# Patient Record
Sex: MALE | Race: BLACK OR AFRICAN AMERICAN | RURAL
[De-identification: names, ages, dates, MRNs, and addresses within clinical notes are randomized per-mention and may not be internally consistent; named-entity substitution may affect disease eponyms.]

---

## 2017-03-06 DIAGNOSIS — I10 ESSENTIAL HYPERTENSION: ICD-10-CM

## 2017-03-06 NOTE — TELEPHONE ENCOUNTER
Patient last seen by Dr. Harrington Lesch in 2014. The other providers he seen in the office recently are no longer here.

## 2017-03-09 RX ORDER — LISINOPRIL 10 MG/1
10 TABLET ORAL DAILY
Qty: 30 TAB | Refills: 2 | OUTPATIENT
Start: 2017-03-09

## 2017-03-09 NOTE — TELEPHONE ENCOUNTER
Attempted to call. No answer. Message left. Patient will need an appointment. Has not been seen here in over a year.

## 2017-08-09 ENCOUNTER — OFFICE VISIT (OUTPATIENT)
Dept: FAMILY MEDICINE CLINIC | Age: 52
End: 2017-08-09

## 2017-08-09 VITALS
OXYGEN SATURATION: 99 % | HEIGHT: 70 IN | DIASTOLIC BLOOD PRESSURE: 111 MMHG | BODY MASS INDEX: 26.4 KG/M2 | TEMPERATURE: 98.2 F | WEIGHT: 184.4 LBS | SYSTOLIC BLOOD PRESSURE: 181 MMHG | RESPIRATION RATE: 20 BRPM | HEART RATE: 88 BPM

## 2017-08-09 DIAGNOSIS — Z91.14 NON COMPLIANCE W MEDICATION REGIMEN: ICD-10-CM

## 2017-08-09 DIAGNOSIS — F10.10 ALCOHOL ABUSE, DAILY USE: ICD-10-CM

## 2017-08-09 DIAGNOSIS — I10 ESSENTIAL HYPERTENSION: Primary | ICD-10-CM

## 2017-08-09 DIAGNOSIS — R80.9 PROTEINURIA, UNSPECIFIED TYPE: ICD-10-CM

## 2017-08-09 LAB
BILIRUB UR QL STRIP: NEGATIVE
CREAT BLD-MCNC: 200 MG/DL (ref 0.6–1.3)
GLUCOSE UR-MCNC: NEGATIVE MG/DL
KETONES P FAST UR STRIP-MCNC: NEGATIVE MG/DL
MICROALB/CRT. RATIO, 140096: ABNORMAL MG/G
MICROALBUMIN UR TEST STR-MCNC: 80 MG/L
PH UR STRIP: 5.5 [PH] (ref 4.6–8)
PROT UR QL STRIP: NORMAL MG/DL
SP GR UR STRIP: 1.02 (ref 1–1.03)
UA UROBILINOGEN AMB POC: NORMAL (ref 0.2–1)
URINALYSIS CLARITY POC: CLEAR
URINALYSIS COLOR POC: YELLOW
URINE BLOOD POC: NEGATIVE
URINE LEUKOCYTES POC: NEGATIVE
URINE NITRITES POC: NEGATIVE

## 2017-08-09 RX ORDER — LISINOPRIL 10 MG/1
10 TABLET ORAL DAILY
Qty: 30 TAB | Refills: 5 | Status: SHIPPED | OUTPATIENT
Start: 2017-08-09 | End: 2019-09-23 | Stop reason: SDUPTHER

## 2017-08-09 RX ORDER — CLONIDINE HYDROCHLORIDE 0.1 MG/1
0.1 TABLET ORAL
Qty: 1 TAB | Refills: 0
Start: 2017-08-09 | End: 2017-08-09

## 2017-08-09 NOTE — PATIENT INSTRUCTIONS
Please monitor your blood pressure. Goal is less than 140/90. Acute High Blood Pressure: Care Instructions  Your Care Instructions  Acute high blood pressure is very high blood pressure. It's a serious problem. Very high blood pressure can damage your brain, heart, eyes, and kidneys. You may have been given medicines to lower your blood pressure. You may have gotten them as pills or through a needle in one of your veins. This is called an IV. And maybe you were given other medicines too. These can be needed when high blood pressure causes other problems. To keep your blood pressure at a lower level, you may need to make healthy lifestyle changes. And you will probably need to take medicines. Be sure to follow up with your doctor about your blood pressure and what you can do about it. Follow-up care is a key part of your treatment and safety. Be sure to make and go to all appointments, and call your doctor if you are having problems. It's also a good idea to know your test results and keep a list of the medicines you take. How can you care for yourself at home? · See your doctor as often as he or she recommends. This is to make sure your blood pressure is under control. You will probably go at least 2 times a year. But it may be more often at first.  · Take your blood pressure medicine exactly as prescribed. You may take one or more types. They include diuretics, beta-blockers, ACE inhibitors, calcium channel blockers, and angiotensin II receptor blockers. Call your doctor if you think you are having a problem with your medicine. · If you take blood pressure medicine, talk to your doctor before you take decongestants or anti-inflammatory medicine, such as ibuprofen. These can raise blood pressure. · Learn how to check your blood pressure at home. Check it often. · Ask your doctor if it's okay to drink alcohol. · Talk to your doctor about lifestyle changes that can help blood pressure.  These include being active and not smoking. When should you call for help? Call 911 anytime you think you may need emergency care. This may mean having symptoms that suggest that your blood pressure is causing a serious heart or blood vessel problem. Your blood pressure may be over 180/110. For example, call 911 if:  · You have symptoms of a heart attack. These may include:  ¨ Chest pain or pressure, or a strange feeling in the chest.  ¨ Sweating. ¨ Shortness of breath. ¨ Nausea or vomiting. ¨ Pain, pressure, or a strange feeling in the back, neck, jaw, or upper belly or in one or both shoulders or arms. ¨ Lightheadedness or sudden weakness. ¨ A fast or irregular heartbeat. · You have symptoms of a stroke. These may include:  ¨ Sudden numbness, tingling, weakness, or loss of movement in your face, arm, or leg, especially on only one side of your body. ¨ Sudden vision changes. ¨ Sudden trouble speaking. ¨ Sudden confusion or trouble understanding simple statements. ¨ Sudden problems with walking or balance. ¨ A sudden, severe headache that is different from past headaches. · You have severe back or belly pain. Do not wait until your blood pressure comes down on its own. Get help right away. Call your doctor now or seek immediate care if:  · Your blood pressure is much higher than normal (such as 180/110 or higher), but you don't have symptoms. · You think high blood pressure is causing symptoms, such as:  ¨ Severe headache. ¨ Blurry vision. Watch closely for changes in your health, and be sure to contact your doctor if:  · Your blood pressure measures 140/90 or higher at least 2 times. That means the top number is 140 or higher or the bottom number is 90 or higher, or both. · You think you may be having side effects from your blood pressure medicine. · Your blood pressure is usually normal, but it goes above normal at least 2 times. Where can you learn more?   Go to http://bhaskar-shavonne.info/. Enter G478 in the search box to learn more about \"Acute High Blood Pressure: Care Instructions. \"  Current as of: August 8, 2016  Content Version: 11.3  © 4931-1983 [employer]. Care instructions adapted under license by [employer] (which disclaims liability or warranty for this information). If you have questions about a medical condition or this instruction, always ask your healthcare professional. [address] any warranty or liability for your use of this information.

## 2017-08-09 NOTE — PROGRESS NOTES
Health Maintenance Due   Topic Date Due    DTaP/Tdap/Td series (1 - Tdap) 10/23/1987    FOBT Q 1 YEAR AGE 50-75  10/23/2016    INFLUENZA AGE 9 TO ADULT  08/01/2017

## 2017-08-09 NOTE — MR AVS SNAPSHOT
Visit Information Date & Time Provider Department Dept. Phone Encounter #  
 8/9/2017  3:40 PM Malgorzata Walker MD [address] [place] [address] [phone] [id number] Follow-up Instructions Return in about 6 months (around 2/9/2018). Upcoming Health Maintenance Date Due DTaP/Tdap/Td series (1 - Tdap) 10/23/1987 FOBT Q 1 YEAR AGE 50-75 10/23/2016 INFLUENZA AGE 9 TO ADULT 8/1/2017 Allergies as of 8/9/2017  Review Complete On: 8/9/2017 By: Chemo Bingham Severity Noted Reaction Type Reactions Penicillin G High 06/07/2012   Systemic Swelling Tongue swelling Current Immunizations  Never Reviewed No immunizations on file. Not reviewed this visit You Were Diagnosed With   
  
 Codes Comments Essential hypertension    -  Primary ICD-10-CM: I10 
ICD-9-CM: 401.9 Proteinuria, unspecified type     ICD-10-CM: R80.9 ICD-9-CM: 791.0 Vitals BP Pulse Temp Resp Height(growth percentile) Weight(growth percentile) (!) 181/111 88 98.2 °F (36.8 °C) (Oral) 20 5' 10\" (1.778 m) 184 lb 6.4 oz (83.6 kg) SpO2 BMI Smoking Status 99% 26.46 kg/m2 Never Smoker Vitals History BMI and BSA Data Body Mass Index Body Surface Area  
 26.46 kg/m 2 2.03 m 2 Preferred Pharmacy Pharmacy Name Phone [address] [place], [place] - [address] [phone] Your Updated Medication List  
  
   
This list is accurate as of: 8/9/17  4:57 PM.  Always use your most recent med list.  
  
  
  
  
 cloNIDine HCl 0.1 mg tablet Commonly known as:  CATAPRES Take 1 Tab by mouth now for 1 dose. lisinopril 10 mg tablet Commonly known as:  [place] Take 1 Tab by mouth daily. Prescriptions Sent to Pharmacy Refills  
 lisinopril (PRINIVIL, ZESTRIL) 10 mg tablet 5 Sig: Take 1 Tab by mouth daily.   
 Class: Normal  
 Pharmacy: [facility] [place], [address] [facility] #: [phone] Route: Oral  
  
We Performed the Following AMB POC EKG ROUTINE W/ 12 LEADS, INTER & REP [34367 CPT(R)] AMB POC URINALYSIS DIP STICK AUTO W/O MICRO [06449 CPT(R)] AMB POC URINE, MICROALBUMIN, SEMIQUANT (1 RESULT) [31178 CPT(R)] Follow-up Instructions Return in about 6 months (around 2/9/2018). Patient Instructions Please monitor your blood pressure. Goal is less than 140/90. Acute High Blood Pressure: Care Instructions Your Care Instructions Acute high blood pressure is very high blood pressure. It's a serious problem. Very high blood pressure can damage your brain, heart, eyes, and kidneys. You may have been given medicines to lower your blood pressure. You may have gotten them as pills or through a needle in one of your veins. This is called an IV. And maybe you were given other medicines too. These can be needed when high blood pressure causes other problems. To keep your blood pressure at a lower level, you may need to make healthy lifestyle changes. And you will probably need to take medicines. Be sure to follow up with your doctor about your blood pressure and what you can do about it. Follow-up care is a key part of your treatment and safety. Be sure to make and go to all appointments, and call your doctor if you are having problems. It's also a good idea to know your test results and keep a list of the medicines you take. How can you care for yourself at home? · See your doctor as often as he or she recommends. This is to make sure your blood pressure is under control. You will probably go at least 2 times a year. But it may be more often at first. 
· Take your blood pressure medicine exactly as prescribed. You may take one or more types. They include diuretics, beta-blockers, ACE inhibitors, calcium channel blockers, and angiotensin II receptor blockers.  Call your doctor if you think you are having a problem with your medicine. · If you take blood pressure medicine, talk to your doctor before you take decongestants or anti-inflammatory medicine, such as ibuprofen. These can raise blood pressure. · Learn how to check your blood pressure at home. Check it often. · Ask your doctor if it's okay to drink alcohol. · Talk to your doctor about lifestyle changes that can help blood pressure. These include being active and not smoking. When should you call for help? Call 911 anytime you think you may need emergency care. This may mean having symptoms that suggest that your blood pressure is causing a serious heart or blood vessel problem. Your blood pressure may be over 180/110. For example, call 911 if: 
· You have symptoms of a heart attack. These may include: ¨ Chest pain or pressure, or a strange feeling in the chest. 
¨ Sweating. ¨ Shortness of breath. ¨ Nausea or vomiting. ¨ Pain, pressure, or a strange feeling in the back, neck, jaw, or upper belly or in one or both shoulders or arms. ¨ Lightheadedness or sudden weakness. ¨ A fast or irregular heartbeat. · You have symptoms of a stroke. These may include: 
¨ Sudden numbness, tingling, weakness, or loss of movement in your face, arm, or leg, especially on only one side of your body. ¨ Sudden vision changes. ¨ Sudden trouble speaking. ¨ Sudden confusion or trouble understanding simple statements. ¨ Sudden problems with walking or balance. ¨ A sudden, severe headache that is different from past headaches. · You have severe back or belly pain. Do not wait until your blood pressure comes down on its own. Get help right away. Call your doctor now or seek immediate care if: 
· Your blood pressure is much higher than normal (such as 180/110 or higher), but you don't have symptoms. · You think high blood pressure is causing symptoms, such as: ¨ Severe headache. ¨ Blurry vision. Watch closely for changes in your health, and be sure to contact your doctor if: 
· Your blood pressure measures 140/90 or higher at least 2 times. That means the top number is 140 or higher or the bottom number is 90 or higher, or both. · You think you may be having side effects from your blood pressure medicine. · Your blood pressure is usually normal, but it goes above normal at least 2 times. Where can you learn more? Go to http://bhaskar-shavonne.info/. Enter E864 in the search box to learn more about \"Acute High Blood Pressure: Care Instructions. \" Current as of: August 8, 2016 Content Version: 11.3 © 7013-4647 [employer]. Care instructions adapted under license by [employer] (which disclaims liability or warranty for this information). If you have questions about a medical condition or this instruction, always ask your healthcare professional. [place] [address] any warranty or liability for your use of this information. Introducing [address]! [facility] introduces [employer] patient portal. Now you can access parts of your medical record, email your doctor's office, and request medication refills online. 1. In your internet browser, go to [email or web address] 2. Click on the First Time User? Click Here link in the Sign In box. You will see the New Member Sign Up page. 3. Enter your [employer] Access Code exactly as it appears below. You will not need to use this code after youve completed the sign-up process. If you do not sign up before the expiration date, you must request a new code. · [employer] Access Code: [email or web address] Expires: 11/7/2017  3:45 PM 
 
4. Enter the last four digits of your Social Security Number (xxxx) and Date of Birth (mm/dd/yyyy) as indicated and click Submit. You will be taken to the next sign-up page. 5. Create a ChangePandat ID.  This will be your [employer] login ID and cannot be changed, so think of one that is secure and easy to remember. 6. Create a [employer] password. You can change your password at any time. 7. Enter your Password Reset Question and Answer. This can be used at a later time if you forget your password. 8. Enter your e-mail address. You will receive e-mail notification when new information is available in [address] 9. Click Sign Up. You can now view and download portions of your medical record. 10. Click the Download Summary menu link to download a portable copy of your medical information. If you have questions, please visit the Frequently Asked Questions section of the [employer] website. Remember, [employer] is NOT to be used for urgent needs. For medical emergencies, dial 911. Now available from your iPhone and Android! Please provide this summary of care documentation to your next provider. Your primary care clinician is listed as Gayle Harding. If you have any questions after today's visit, please call [phone].

## 2017-08-12 NOTE — PROGRESS NOTES
Patient: Polly Huerta MRN: [id number]  SSN: [id number]    YOB: 1965  Age: 49 y.o. Sex: male        Subjective:     Chief Complaint   Patient presents with    Hypertension    Medication Refill       HPI: he is a 49y.o. year old male who presents for medication refills. Patient with hx of HTN and has not been on medication for months. Patient denies HA, dizziness, SOB, CP, abdominal pain, dysuria, myalgias or arthralgias. He works as a cook. He admits to drinking 8-9 beers daily. Has hx of palpitations. Risks of uncontrolled HTN discussed at length. Importance of regular follow up and monitoring of BP and lab results discussed as well. Also advised patient to apply for Care Card. Patient expressed understanding. Encounter Diagnoses   Name Primary?  Essential hypertension Yes    Proteinuria, unspecified type     Alcohol abuse, daily use     Non compliance w medication regimen        BP Readings from Last 3 Encounters:   08/09/17 (!) 181/111   01/06/16 (!) 151/97   09/16/15 139/82       Wt Readings from Last 3 Encounters:   08/09/17 184 lb 6.4 oz (83.6 kg)   01/06/16 184 lb (83.5 kg)   09/16/15 184 lb 9.6 oz (83.7 kg)     Body mass index is 26.46 kg/(m^2). Patient advised to log blood pressures at home 2-3 times weekly and bring to next visit. Call office as soon as possible if BP's over 140/90 on multiple occasions or with symptoms of dizziness, chest pain, shortness of breath, headache or ankle swelling. Our goal is to normalize the blood pressure to decrease the risks of strokes and heart attacks. The patient is in agreement with the plan. Current and past medical information:    Current Medications after this visit[de-identified]     Current Outpatient Prescriptions   Medication Sig    lisinopril (PRINIVIL, ZESTRIL) 10 mg tablet Take 1 Tab by mouth daily. No current facility-administered medications for this visit.         Patient Active Problem List    Diagnosis Date Noted    Essential hypertension 09/16/2015    Bilateral low back pain without sciatica 09/16/2015    Anxiety 06/07/2012    Heart palpitations 06/07/2012       History reviewed. No pertinent past medical history. Allergies   Allergen Reactions    Penicillin G Swelling     Tongue swelling       History reviewed. No pertinent surgical history. Social History     Social History    Marital status: SINGLE     Spouse name: N/A    Number of children: N/A    Years of education: N/A     Social History Main Topics    Smoking status: Never Smoker    Smokeless tobacco: Never Used    Alcohol use No    Drug use: No    Sexual activity: Not Asked     Other Topics Concern    None     Social History Narrative         Objective:     Review of Systems:  Constitutional: Negative for fatigue or malaise  Derm: Negative for rash or lesion  HEENT: Negative for acute hearing or vision changes  Cardiovascular: Negative for dizziness, chest pain or palpitations  Respiratory: Negative for cough, wheezing or SOB  Gastreintestinal: Negative for nausea or abdominal pain  Genital/urinary: Negative for dysuria or voiding dysfunction  Muscoloskeletal: Negative for acute myalgias or arthralgias   Neurological: Negative for headache, weakness or paresthesia  Psychological: Negative for depression or anxiety      Vitals:    08/09/17 1558   BP: (!) 181/111   Pulse: 88   Resp: 20   Temp: 98.2 °F (36.8 °C)   TempSrc: Oral   SpO2: 99%   Weight: 184 lb 6.4 oz (83.6 kg)   Height: 5' 10\" (1.778 m)      Body mass index is 26.46 kg/(m^2).     Physical Exam:  Constitutional: well developed, well nourished, in no acute distress  Skin: warm and dry  Head: normocephalic, atraumatic  Eyes: sclera clear, EOMI  Neck: normal range of motion  Cardiovascular: normal S1, S2, regular rate and rhythm  Respiratory: clear to auscultation bilaterally with symmetrical effort  Extremities: full range of motion, no edema  Neurology: normal strength and sensation  Psych: active, alert and oriented, affect appropriate       Health Maintenance Due   Topic Date Due    DTaP/Tdap/Td series (1 - Tdap) 10/23/1987    FOBT Q 1 YEAR AGE 50-75  10/23/2016    INFLUENZA AGE 9 TO ADULT  08/01/2017       Risk, benefits and potential costs of recommended health maintenance discussed. Patient expressed understanding and declined at this time. Assessment and orders:       ICD-10-CM ICD-9-CM    1. Essential hypertension I10 401.9 lisinopril (PRINIVIL, ZESTRIL) 10 mg tablet      AMB POC EKG ROUTINE W/ 12 LEADS, INTER & REP      AMB POC URINALYSIS DIP STICK AUTO W/O MICRO      AMB POC URINE, MICROALBUMIN, SEMIQUANT (1 RESULT)   2. Proteinuria, unspecified type R80.9 791.0    3. Alcohol abuse, daily use F10.10 305.01    4. Non compliance w medication regimen Z91.14 V15.81          Plan of care:  Diagnoses were discussed in detail with patient. Medication risks/benefits/side effects discussed with patient. Risks of non-compliance with medication and uncontrolled BP discussed at length with patient. These include, but are not limited to heart attack, stroke and sudden death. Patient expressed understanding. Strongly advised patient to stop all use of alcohol. All of the patient's questions were addressed and answered to apparent satisfaction. The patient understands and agrees with our plan of care. The patient knows to call back if they have questions about the plan of care or if symptoms change. The patient received an After-Visit Summary which contains VS, diagnoses, orders, allergy and medication lists. No care team member to display    Follow-up Disposition:  Return in about 6 months (around 2/9/2018). No future appointments.     Signed By: Aki Tomlinson MD     August 12, 2017

## 2018-10-22 ENCOUNTER — OFFICE VISIT (OUTPATIENT)
Dept: FAMILY MEDICINE CLINIC | Age: 53
End: 2018-10-22

## 2018-10-22 VITALS
TEMPERATURE: 97.7 F | RESPIRATION RATE: 20 BRPM | BODY MASS INDEX: 24.97 KG/M2 | SYSTOLIC BLOOD PRESSURE: 148 MMHG | HEART RATE: 69 BPM | HEIGHT: 70 IN | DIASTOLIC BLOOD PRESSURE: 87 MMHG | WEIGHT: 174.4 LBS | OXYGEN SATURATION: 100 %

## 2018-10-22 DIAGNOSIS — M79.89 FINGER SWELLING: ICD-10-CM

## 2018-10-22 DIAGNOSIS — H72.91 EARDRUM RUPTURE, RIGHT: Primary | ICD-10-CM
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
## 2018-10-22 NOTE — LETTER
NOTIFICATION RETURN TO WORK  
 
10/22/2018 1:32 PM 
 
Mr. Surinder Pollack [address] 
[address] To Whom It May Concern: 
 
Surinder Pollack is currently under the care of Israel Álvarez. He will return to work on 10/23/2018. If there are questions or concerns please have the patient contact our office. Sincerely, Shruthi Ford MD

## 2018-10-22 NOTE — PATIENT INSTRUCTIONS
Keeping Ears Dry: Care Instructions Your Care Instructions Your doctor wants you to keep water from getting into your ears. You may need to do this because of a ruptured eardrum, an ear infection, or other ear problems. Follow-up care is a key part of your treatment and safety. Be sure to make and go to all appointments, and call your doctor if you are having problems. It's also a good idea to know your test results and keep a list of the medicines you take. How can you care for yourself at home? · Take baths until your doctor says you can take showers again. Avoid getting water in the ear until after the problem clears up. Ask your doctor if you should use earplugs to keep water out of your ears. · Do not swim until your doctor says you can. · If you get water in your ears, turn your head to each side and pull the earlobe in different directions. This will help the water run out. If your ears are still wet, use a hair dryer set on the lowest heat. Hold the dryer several inches from your ear. · Use your medicines exactly as prescribed. Call your doctor if you think you are having a problem with your medicine. Do not put drops in your ears unless your doctor prescribes them. When should you call for help? Watch closely for changes in your health, and be sure to contact your doctor if you have any problems. Where can you learn more? Go to http://bhaskar-shavonne.info/. Enter N904 in the search box to learn more about \"Keeping Ears Dry: Care Instructions. \" Current as of: March 28, 2018 Content Version: 11.8 © 4770-3234 [employer]. Care instructions adapted under license by [employer] (which disclaims liability or warranty for this information). If you have questions about a medical condition or this instruction, always ask your healthcare professional. [address] any warranty or liability for your use of this information.

## 2018-10-22 NOTE — PROGRESS NOTES
1. Have you been to the ER, urgent care clinic since your last visit? Hospitalized since your last visit? No 
 
2. Have you seen or consulted any other health care providers outside of the [address] [place] [address] since your last visit? Include any pap smears or colon screening. No 
Reviewed record in preparation for visit and have necessary documentation Goals that were addressed and/or need to be completed during or after this appointment include Health Maintenance Due Topic Date Due  
 DTaP/Tdap/Td series (1 - Tdap) 10/23/1987  Shingrix Vaccine Age 50> (1 of 2) 10/23/2016  FOBT Q 1 YEAR AGE 50-75  10/23/2016  Influenza Age 5 to Adult  08/01/2018

## 2018-10-26 NOTE — PROGRESS NOTES
Patient: Silvio Austin MRN: [id number]  SSN: [id number] YOB: 1965  Age: 47 y.o. Sex: male Subjective: Chief Complaint Patient presents with  
 Hand Swelling  Ear Pain HPI: he is a 47y.o. year old male who presents for right ear pain. Patient says he fell against wall when changing light bulb. Says he has been unable to hear out of right ear since that time. Patient denies HA, dizziness, SOB, CP, abdominal pain, dysuria, myalgias or arthralgias. He works as a cook. And complains of right distal finger swelling. Denies acute injury. Encounter Diagnoses Name Primary?  Eardrum rupture, right Yes  Finger swelling  Fall in home, initial encounter BP Readings from Last 3 Encounters:  
10/22/18 148/87  
08/09/17 (!) 181/111  
01/06/16 (!) 151/97 Wt Readings from Last 3 Encounters:  
10/22/18 174 lb 6.4 oz (79.1 kg) 08/09/17 184 lb 6.4 oz (83.6 kg) 01/06/16 184 lb (83.5 kg) Body mass index is 25.02 kg/m². Patient advised to log blood pressures at home 2-3 times weekly and bring to next visit. Call office as soon as possible if BP's over 140/90 on multiple occasions or with symptoms of dizziness, chest pain, shortness of breath, headache or ankle swelling. Our goal is to normalize the blood pressure to decrease the risks of strokes and heart attacks. The patient is in agreement with the plan. Current and past medical information: 
 
Current Medications after this visit[de-identified]    
Current Outpatient Medications Medication Sig  
 lisinopril (PRINIVIL, ZESTRIL) 10 mg tablet Take 1 Tab by mouth daily. No current facility-administered medications for this visit. Patient Active Problem List  
 Diagnosis Date Noted  Essential hypertension 09/16/2015  Bilateral low back pain without sciatica 09/16/2015  Anxiety 06/07/2012  Heart palpitations 06/07/2012 History reviewed. No pertinent past medical history. Allergies Allergen Reactions  Penicillin G Swelling Tongue swelling History reviewed. No pertinent surgical history. Social History Socioeconomic History  Marital status: SINGLE Spouse name: Not on file  Number of children: Not on file  Years of education: Not on file  Highest education level: Not on file Social Needs  Financial resource strain: Not on file  Food insecurity - worry: Not on file  Food insecurity - inability: Not on file  Transportation needs - medical: Not on file  Transportation needs - non-medical: Not on file Occupational History  Not on file Tobacco Use  Smoking status: Never Smoker  Smokeless tobacco: Never Used Substance and Sexual Activity  Alcohol use: No  
 Drug use: No  
 Sexual activity: Not on file Other Topics Concern  Not on file Social History Narrative  Not on file Objective:  
 
Review of Systems: 
Constitutional: Negative for fatigue or malaise Derm: see HPI HEENT: see HPI Cardiovascular: Negative for dizziness, chest pain or palpitations Respiratory: Negative for cough, wheezing or SOB Gastreintestinal: Negative for nausea or abdominal pain Genital/urinary: Negative for dysuria or voiding dysfunction Muscoloskeletal: Negative for acute myalgias or arthralgias Neurological: Negative for headache, weakness or paresthesia Psychological: Negative for depression or anxiety Vitals:  
 10/22/18 1309 BP: 148/87 Pulse: 69 Resp: 20 Temp: 97.7 °F (36.5 °C) TempSrc: Oral  
SpO2: 100% Weight: 174 lb 6.4 oz (79.1 kg) Height: 5' 10\" (1.778 m) Body mass index is 25.02 kg/m². Physical Exam: 
Constitutional: well developed, well nourished, in no acute distress Skin: right distal palmar erythema and edema Head: normocephalic, atraumatic Eyes: sclera clear, EOMI Ears: perforated right tympanic membrane Neck: normal range of motion Cardiovascular: normal S1, S2, regular rate and rhythm Respiratory: clear to auscultation bilaterally with symmetrical effort Extremities: full range of motion, no edema Neurology: normal strength and sensation Psych: active, alert and oriented, affect appropriate Health Maintenance Due Topic Date Due  
 DTaP/Tdap/Td series (1 - Tdap) 10/23/1987  Shingrix Vaccine Age 50> (1 of 2) 10/23/2016  FOBT Q 1 YEAR AGE 50-75  10/23/2016 Risk, benefits and potential costs of recommended health maintenance discussed. Patient expressed understanding and declined at this time. Assessment and orders: ICD-10-CM ICD-9-CM 1. Eardrum rupture, right H72.91 384.20   
2. Finger swelling M79.89 729.81   
3. Fall in home, initial encounter W19. [place] Fines Y980.3 Y92.009 E849.0 Plan of care: 
Diagnoses were discussed in detail with patient. Patient sans health insurance. Will need follow up to monitor healing of right ear. Advised to keep fear dry. Medication risks/benefits/side effects discussed with patient. Patient expressed understanding. All of the patient's questions were addressed and answered to apparent satisfaction. The patient understands and agrees with our plan of care. The patient knows to call back if they have questions about the plan of care or if symptoms change. The patient received an After-Visit Summary which contains VS, diagnoses, orders, allergy and medication lists. No care team member to display Follow-up Disposition: 
Return in about 4 weeks (around 11/19/2018), or if symptoms worsen or fail to improve. No future appointments. Signed By: Jessica Guy MD   
 October 26, 2018

## 2019-09-23 ENCOUNTER — OFFICE VISIT (OUTPATIENT)
Dept: FAMILY MEDICINE CLINIC | Age: 54
End: 2019-09-23

## 2019-09-23 VITALS
HEART RATE: 73 BPM | RESPIRATION RATE: 16 BRPM | OXYGEN SATURATION: 96 % | SYSTOLIC BLOOD PRESSURE: 156 MMHG | BODY MASS INDEX: 25.2 KG/M2 | WEIGHT: 176 LBS | DIASTOLIC BLOOD PRESSURE: 92 MMHG | TEMPERATURE: 97.2 F | HEIGHT: 70 IN

## 2019-09-23 DIAGNOSIS — I10 ESSENTIAL HYPERTENSION: ICD-10-CM

## 2019-09-23 DIAGNOSIS — G43.109 MIGRAINE WITH AURA AND WITHOUT STATUS MIGRAINOSUS, NOT INTRACTABLE: Primary | ICD-10-CM

## 2019-09-23 RX ORDER — LISINOPRIL 10 MG/1
10 TABLET ORAL DAILY
Qty: 30 TAB | Refills: 5 | Status: SHIPPED | OUTPATIENT
Start: 2019-09-23 | End: 2020-01-29 | Stop reason: SDUPTHER

## 2019-09-23 RX ORDER — SUMATRIPTAN 100 MG/1
TABLET, FILM COATED ORAL
Qty: 5 TAB | Refills: 0 | Status: SHIPPED | OUTPATIENT
Start: 2019-09-23 | End: 2020-01-29

## 2019-09-23 RX ORDER — LISINOPRIL 10 MG/1
10 TABLET ORAL DAILY
Qty: 30 TAB | Refills: 5 | Status: SHIPPED | OUTPATIENT
Start: 2019-09-23 | End: 2019-09-23 | Stop reason: SDUPTHER

## 2019-09-23 NOTE — PROGRESS NOTES
CC: HA    HPI: Pt is a 47 y.o. male who presents for HA. Last week he was working on a water line in his yard and had his head down. When he stood up he felt like his vision went black and he had to lay down. He did not lose consciousness. That night he woke up and looked at a light on his phone, and developed a sharp pain that felt like it ran from his right eye to the back of his head. That lasted for a few minutes and then resolved. Now he is having a duller pain over his temples, and floaters which he has at baseline. Associated with a sensation of fatigue. No changes in visual acuity and no redness of the eye. He tried some Tylenol which helped the head pain. Unfortunately he does not have vision insurance and has not seen the eye doctor in many years. He does report a h/o similar symptoms several years ago that resolved on their own with time. Of note, he has a h/o HTN and has been out of his lisinopril for some time (likely a year and a half based on the last date it was written). He does not check BP at home. He declines labs today despite being advised that his kidney function was a little lower than ideal for his age on the last check 3 years ago and discussing that elevated BP can cause problems with the kidneys. He states he will consider getting the labs drawn in the near future. History reviewed. No pertinent past medical history.     Family History   Problem Relation Age of Onset    Heart Disease Mother        Social History     Tobacco Use    Smoking status: Never Smoker    Smokeless tobacco: Never Used   Substance Use Topics    Alcohol use: No    Drug use: No       ROS:  Per HPI    PE:  Visit Vitals  BP (!) 156/92 (BP 1 Location: Left arm, BP Patient Position: Sitting)   Pulse 73   Temp 97.2 °F (36.2 °C) (Oral)   Resp 16   Ht 5' 10\" (1.778 m)   Wt 176 lb (79.8 kg)   SpO2 96%   BMI 25.25 kg/m²     Gen: Pt sitting in chair, in NAD  Head: Normocephalic, atraumatic  Eyes: Sclera non-erythematous, EOM intact, PERRL. Visual fields full to confrontation b/l. Visual acuity 20/50 OD, 20/30 OS  Ears: TM's with clear effusion b/l, no erythema  Throat: MMM, normal lips, tongue and gums  Neck: Supple  CVS: Normal S1, S2, no m/r/g  Resp: CTAB, no wheezes or rales  Extrem: Atraumatic, no cyanosis or edema  Pulses: 2+   Skin: Warm, dry  Neuro: Alert, oriented, appropriate      A/P:   Encounter Diagnoses     ICD-10-CM ICD-9-CM   1. Migraine with aura and without status migrainosus, not intractable G43.109 346.00   2. Essential hypertension I10 401.9     1. Migraine with aura and without status migrainosus, not intractable: Discussed with pt, symptoms could be related to eye pathology and ideally he would see Ophthalmology to get checked out. We don't know his baseline visual acuity but he denies an abrupt change. He has no erythema or persistent eye pain to suggest glaucoma. Considering he also has headache it is possible he is having a migraine with the initial black spots/floaters being an aura. Would like him to avoid NSAIDs as we don't know his kidney function and it was borderline when last checked in 2016. Will do trial of imitrex and pt to call of RTC if symptoms not improving.  - SUMAtriptan (IMITREX) 100 mg tablet; Take one half tab as needed for migraine. May repeat 2 hours later if no improvement. Do not take more than two half-tablets in one day. Dispense: 5 Tab; Refill: 0    2. Essential hypertension: Discussed importance of getting labs drawn. He will think about it and might come back for a lab-only visit  - LIPID PANEL  - METABOLIC PANEL, COMPREHENSIVE  - lisinopril (PRINIVIL, ZESTRIL) 10 mg tablet; Take 1 Tab by mouth daily. Dispense: 30 Tab; Refill: 5      RTC in 4 weeks for BP check      Discussed diagnoses in detail with patient. Medication risks/benefits/side effects discussed with patient. All of the patient's questions were addressed.  The patient understands and agrees with our plan of care. The patient knows to call back if they are unsure of or forget any changes we discussed today or if the symptoms change. The patient received an After-Visit Summary which contains VS, orders, medication list and allergy list. This can be used as a \"mini-medical record\" should they have to seek medical care while out of town. No current outpatient medications on file prior to visit. No current facility-administered medications on file prior to visit.

## 2019-09-23 NOTE — LETTER
NOTIFICATION RETURN TO WORK 
 
9/23/2019 10:00 AM 
 
Mr. Vic Feliz [address] 
[address] [place] To Whom It May Concern: 
 
Vic Feliz is currently under the care of Israel Álvarez. He was out of work on 9/20/19 and 9/21/19. If there are questions or concerns please have the patient contact our office.  
 
 
 
Sincerely, 
 
 
Pina Olea MD

## 2019-09-23 NOTE — PATIENT INSTRUCTIONS
Kem Garcia with [facility]  [address] [place], [place] [address] [id number] [facility]  [phone]    Monitor blood pressure outside the office several times weekly at different times during the day and evening. Bring the record to me in 3 weeks for review. Blood Pressure Record     Patient Name:  ______________________ :  ______________________    Date/Time BP Reading Pulse                                                                                                                                                                                                Home Blood Pressure Test: About This Test  What is it? A home blood pressure test allows you to keep track of your blood pressure at home. Blood pressure is a measure of the force of blood against the walls of your arteries. Blood pressure readings include two numbers, such as 130/80 (say \"130 over 80\"). The first number is the systolic pressure. The second number is the diastolic pressure. Why is this test done? You may do this test at home to:  · Find out if you have high blood pressure. · Track your blood pressure if you have high blood pressure. · Track how well medicine is working to reduce high blood pressure. · Check how lifestyle changes, such as weight loss and exercise, are affecting blood pressure. How can you prepare for the test?  · Do not use caffeine, tobacco, or medicines known to raise blood pressure (such as nasal decongestant sprays) for at least 30 minutes before taking your blood pressure. · Do not exercise for at least 30 minutes before taking your blood pressure. What happens before the test?  Take your blood pressure while you feel comfortable and relaxed.  Sit quietly with both feet on the floor for at least 5 minutes before the test.  What happens during the test?  · Sit with your arm slightly bent and resting on a table so that your upper arm is at the same level as your heart.  · Roll up your sleeve or take off your shirt to expose your upper arm. · Wrap the blood pressure cuff around your upper arm so that the lower edge of the cuff is about 1 inch above the bend of your elbow. Proceed with the following steps depending on if you are using an automatic or manual pressure monitor. Automatic blood pressure monitors  · Press the on/off button on the automatic monitor and wait until the ready-to-measure \"heart\" symbol appears next to zero in the display window. · Press the start button. The cuff will inflate and deflate by itself. · Your blood pressure numbers will appear on the screen. · Write your numbers in your log book, along with the date and time. Manual blood pressure monitors  · Place the earpieces of a stethoscope in your ears, and place the bell of the stethoscope over the artery, just below the cuff. · Close the valve on the rubber inflating bulb. · Squeeze the bulb rapidly with your opposite hand to inflate the cuff until the dial or column of mercury reads about 30 mm Hg higher than your usual systolic pressure. If you do not know your usual pressure, inflate the cuff to 210 mm Hg or until the pulse at your wrist disappears. · Open the pressure valve just slightly by twisting or pressing the valve on the bulb. · As you watch the pressure slowly fall, note the level on the dial at which you first start to hear a pulsing or tapping sound through the stethoscope. This is your systolic blood pressure. · Continue letting the air out slowly. The sounds will become muffled and will finally disappear. Note the pressure when the sounds completely disappear. This is your diastolic blood pressure. Let out all the remaining air. · Write your numbers in your log book, along with the date and time. What else should you know about the test?  It is more accurate to take the average of several readings made throughout the day than to rely on a single reading.  It's normal for blood pressure to go up and down throughout the day. Follow-up care is a key part of your treatment and safety. Be sure to make and go to all appointments, and call your doctor if you are having problems. It's also a good idea to keep a list of the medicines you take. Where can you learn more? Go to http://bhaskar-shavonne.info/. Enter C427 in the search box to learn more about \"Home Blood Pressure Test: About This Test.\"  Current as of: April 9, 2019  Content Version: 12.2  © 8152-2446 [employer]. Care instructions adapted under license by [employer] (which disclaims liability or warranty for this information). If you have questions about a medical condition or this instruction, always ask your healthcare professional. [address] any warranty or liability for your use of this information. Migraine Headache: Care Instructions  Your Care Instructions  Migraines are painful, throbbing headaches that often start on one side of the head. They may cause nausea and vomiting and make you sensitive to light, sound, or smell. Without treatment, migraines can last from 4 hours to a few days. Medicines can help prevent migraines or stop them after they have started. Your doctor can help you find which ones work best for you. Follow-up care is a key part of your treatment and safety. Be sure to make and go to all appointments, and call your doctor if you are having problems. It's also a good idea to know your test results and keep a list of the medicines you take. How can you care for yourself at home? · Do not drive if you have taken a prescription pain medicine. · Rest in a quiet, dark room until your headache is gone. Close your eyes, and try to relax or go to sleep. Don't watch TV or read. · Put a cold, moist cloth or cold pack on the painful area for 10 to 20 minutes at a time. Put a thin cloth between the cold pack and your skin.   · Use a warm, moist towel or a heating pad set on low to relax tight shoulder and neck muscles. · Have someone gently massage your neck and shoulders. · Take your medicines exactly as prescribed. Call your doctor if you think you are having a problem with your medicine. You will get more details on the specific medicines your doctor prescribes. · Be careful not to take pain medicine more often than the instructions allow. You could get worse or more frequent headaches when the medicine wears off. To prevent migraines  · Keep a headache diary so you can figure out what triggers your headaches. Avoiding triggers may help you prevent headaches. Record when each headache began, how long it lasted, and what the pain was like. (Was it throbbing, aching, stabbing, or dull?) Write down any other symptoms you had with the headache, such as nausea, flashing lights or dark spots, or sensitivity to bright light or loud noise. Note if the headache occurred near your period. List anything that might have triggered the headache. Triggers may include certain foods (chocolate, cheese, wine) or odors, smoke, bright light, stress, or lack of sleep. · If your doctor has prescribed medicine for your migraines, take it as directed. You may have medicine that you take only when you get a migraine and medicine that you take all the time to help prevent migraines. ? If your doctor has prescribed medicine for when you get a headache, take it at the first sign of a migraine, unless your doctor has given you other instructions. ? If your doctor has prescribed medicine to prevent migraines, take it exactly as prescribed. Call your doctor if you think you are having a problem with your medicine. · Find healthy ways to deal with stress. Migraines are most common during or right after stressful times.  Take time to relax before and after you do something that has caused a migraine in the past.  · Try to keep your muscles relaxed by keeping good posture. Check your jaw, face, neck, and shoulder muscles for tension. Try to relax them. When you sit at a desk, change positions often. And make sure to stretch for 30 seconds each hour. · Get plenty of sleep and exercise. · Eat meals on a regular schedule. Avoid foods and drinks that often trigger migraines. These include chocolate, alcohol (especially red wine and port), aspartame, monosodium glutamate (MSG), and some additives found in foods (such as hot dogs, mancuso, cold cuts, aged cheeses, and pickled foods). · Limit caffeine. Don't drink too much coffee, tea, or soda. But don't quit caffeine suddenly. That can also give you migraines. · Do not smoke or allow others to smoke around you. If you need help quitting, talk to your doctor about stop-smoking programs and medicines. These can increase your chances of quitting for good. · If you are taking birth control pills or hormone therapy, talk to your doctor about whether they are triggering your migraines. When should you call for help? Call 911 anytime you think you may need emergency care. For example, call if:    · You have signs of a stroke. These may include:  ? Sudden numbness, paralysis, or weakness in your face, arm, or leg, especially on only one side of your body. ? Sudden vision changes. ? Sudden trouble speaking. ? Sudden confusion or trouble understanding simple statements. ? Sudden problems with walking or balance. ? A sudden, severe headache that is different from past headaches.    Call your doctor now or seek immediate medical care if:    · You have new or worse nausea and vomiting.     · You have a new or higher fever.     · Your headache gets much worse.    Watch closely for changes in your health, and be sure to contact your doctor if:    · You are not getting better after 2 days (48 hours). Where can you learn more? Go to http://bhaskar-shavonne.info/.   Enter J423 in the search box to learn more about \"Migraine Headache: Care Instructions. \"  Current as of: March 28, 2019  Content Version: 12.2  © 3284-6098 [employer]. Care instructions adapted under license by [employer] (which disclaims liability or warranty for this information). If you have questions about a medical condition or this instruction, always ask your healthcare professional. [place] [address] any warranty or liability for your use of this information.

## 2019-09-23 NOTE — PROGRESS NOTES
1. Have you been to the ER, urgent care clinic since your last visit? Hospitalized since your last visit? No    2. Have you seen or consulted any other health care providers outside of the [address] [place] [address] since your last visit? Include any pap smears or colon screening.  No  Reviewed record in preparation for visit and have necessary documentation  Pt did not bring medication to office visit for review    Goals that were addressed and/or need to be completed during or after this appointment include   Health Maintenance Due   Topic Date Due    DTaP/Tdap/Td series (1 - Tdap) 10/23/1987    Shingrix Vaccine Age 50> (1 of 2) 10/23/2016    FOBT Q 1 YEAR AGE 50-75  10/23/2016    Influenza Age 9 to Adult  08/01/2019

## 2019-10-07 ENCOUNTER — OFFICE VISIT (OUTPATIENT)
Dept: FAMILY MEDICINE CLINIC | Age: 54
End: 2019-10-07

## 2019-10-07 VITALS
RESPIRATION RATE: 18 BRPM | WEIGHT: 178.2 LBS | BODY MASS INDEX: 25.51 KG/M2 | TEMPERATURE: 97.6 F | DIASTOLIC BLOOD PRESSURE: 85 MMHG | HEART RATE: 81 BPM | OXYGEN SATURATION: 98 % | HEIGHT: 70 IN | SYSTOLIC BLOOD PRESSURE: 132 MMHG

## 2019-10-07 DIAGNOSIS — Z53.21 PATIENT LEFT AFTER TRIAGE: Primary | ICD-10-CM

## 2019-10-07 NOTE — PROGRESS NOTES
Chief Complaint   Patient presents with    Headache     Visit Vitals  /85 (BP 1 Location: Left arm, BP Patient Position: Sitting)   Pulse 81   Temp 97.6 °F (36.4 °C) (Oral)   Resp 18   Ht 5' 10\" (1.778 m)   Wt 178 lb 3.2 oz (80.8 kg)   SpO2 98%   BMI 25.57 kg/m²     1. Have you been to the ER, urgent care clinic since your last visit? Hospitalized since your last visit? No    2. Have you seen or consulted any other health care providers outside of the [address] [place] [address] since your last visit? Include any pap smears or colon screening.  No    Reviewed record in preparation for visit and have necessary documentation  Pt did not bring medication to office visit for review  opportunity was given for questions  Goals that were addressed and/or need to be completed during or after this appointment include   Health Maintenance Due   Topic Date Due    FOBT Q 1 YEAR AGE 50-75  10/23/2016    Influenza Age 5 to Adult  08/01/2019

## 2019-10-07 NOTE — PATIENT INSTRUCTIONS
October 7, 2019  [address] [id number]      Dear Julius Osullivan: Thank you for requesting access to [facility]. Please follow the instructions below to view your test results, access and download parts of your medical record, view details of your past and upcoming appointments, and view your medications online. How Do I Sign Up? 1. In your internet browser, go to [email or web address]  2. Click on the First Time User? Click Here link in the Sign In box. You will see the New Member Sign Up page. 3. Enter your [facility] Access Code exactly as it appears below. You will not need to use this code after youve completed the sign-up process. If you do not sign up before the expiration date, you must request a new code. · [facility] Access Code: [email or web address]  · Expires: 11/7/2019 10:16 AM    4. Enter the last four digits of your Social Security Number (xxxx) and Date of Birth (mm/dd/yyyy) as indicated and click Submit. You will be taken to the next sign-up page. 5. Create a [facility] ID. This will be your Meet[facility] login ID and cannot be changed, so think of one that is secure and easy to remember. 6. Create a [facility] password. You can change your password at any time. 7. Enter your Password Reset Question and Answer. This can be used at a later time if you forget your password. 8. Enter your e-mail address. You will receive e-mail notification when new information is available in [address]. 9. Click Sign Up. You can now view and download portions of your medical record. 10. Click the Download Summary menu link to download a portable copy of your medical information. Additional Information:              If you require any assistance or have any questions, please contact our [address] at [phone], email at [email or web address] or check online in our Frequently Asked Questions.     Remember, [facility] is NOT to be used for urgent needs. For medical emergencies, dial 911. Now available from your iPhone and Android!     Sincerely,   Matt Holguin

## 2019-10-07 NOTE — PROGRESS NOTES
Patient walked out of the exam room stating \"I cant wait for the doctor, my head hurt\". He told the nurse during triage that he could not afford the Imitrex and the provider was researching the last encounter and printing a good rx coupon for the medication.

## 2019-10-08 ENCOUNTER — OFFICE VISIT (OUTPATIENT)
Dept: FAMILY MEDICINE CLINIC | Age: 54
End: 2019-10-08

## 2019-10-08 VITALS
BODY MASS INDEX: 25.77 KG/M2 | SYSTOLIC BLOOD PRESSURE: 133 MMHG | DIASTOLIC BLOOD PRESSURE: 81 MMHG | RESPIRATION RATE: 16 BRPM | HEART RATE: 67 BPM | WEIGHT: 180 LBS | TEMPERATURE: 97.3 F | HEIGHT: 70 IN | OXYGEN SATURATION: 97 %

## 2019-10-08 DIAGNOSIS — G43.109 MIGRAINE WITH AURA AND WITHOUT STATUS MIGRAINOSUS, NOT INTRACTABLE: Primary | ICD-10-CM

## 2019-10-08 DIAGNOSIS — I10 ESSENTIAL HYPERTENSION: ICD-10-CM

## 2019-10-08 NOTE — PROGRESS NOTES
Progress Note    Patient: Armando Rodríguez MRN: [id number]  SSN: [id number]    YOB: 1965  Age: 47 y.o. Sex: male        Chief Complaint   Patient presents with    Migraine         Subjective:     Problems addressed:  Encounter Diagnoses     ICD-10-CM ICD-9-CM   1. Migraine with aura and without status migrainosus, not intractable G43.109 346.00   2. Essential hypertension I10 401.9     48 y/o M with PMH of HTN presents for migraine f/u. Was seen on 9/23/19 for migraine and elevated BP, prescribed lisinopril and Imitrex, pt picked up lisinopril and started taking, but didn't  Imitrex due to medcation cost ($56?). No hx of migraines before recent episode. Pt reports seeing dots before headache started, \"any light bothers me, can't even look at alarm clock at night\". Pain is right side of head and R eye. Initial migraine 2 weeks ago resolved after several days. Had another migraine 5 days ago, lasted 2-3 days then resolved. Currently pt is without pain or headache. RAFAELA figueroa, helped a \"little tiny bit\". Pt denies any dizziness, LOC, seizure, numbness, tingling, CP, palpitations. Current and past medical information:    Current Medications after this visit[de-identified]     Current Outpatient Medications   Medication Sig    lisinopril (PRINIVIL, ZESTRIL) 10 mg tablet Take 1 Tab by mouth daily.  SUMAtriptan (IMITREX) 100 mg tablet Take one half tab as needed for migraine. May repeat 2 hours later if no improvement. Do not take more than two half-tablets in one day. No current facility-administered medications for this visit. Patient Active Problem List    Diagnosis Date Noted    Essential hypertension 09/16/2015    Bilateral low back pain without sciatica 09/16/2015    Anxiety 06/07/2012    Heart palpitations 06/07/2012       History reviewed. No pertinent past medical history. Allergies   Allergen Reactions    Penicillin G Swelling     Tongue swelling       History reviewed.  No pertinent surgical history. Social History     Socioeconomic History    Marital status: SINGLE     Spouse name: Not on file    Number of children: Not on file    Years of education: Not on file    Highest education level: Not on file   Tobacco Use    Smoking status: Never Smoker    Smokeless tobacco: Never Used   Substance and Sexual Activity    Alcohol use: No    Drug use: No         Review of Systems   Constitutional: Negative for fever. Respiratory: Negative for cough and shortness of breath. Cardiovascular: Negative for chest pain and palpitations. Musculoskeletal: Negative for falls. Neurological: Positive for headaches. Negative for dizziness, tingling and loss of consciousness. Objective:     Vitals:    10/08/19 1112   BP: 133/81   Pulse: 67   Resp: 16   Temp: 97.3 °F (36.3 °C)   TempSrc: Oral   SpO2: 97%   Weight: 180 lb (81.6 kg)   Height: 5' 10\" (1.778 m)      Body mass index is 25.83 kg/m². Physical Exam   Constitutional: He is oriented to person, place, and time. He appears well-developed and well-nourished. No distress. HENT:   Head: Normocephalic and atraumatic. Right Ear: External ear normal.   Left Ear: External ear normal.   Mouth/Throat: Oropharynx is clear and moist. No oropharyngeal exudate. Eyes: Conjunctivae and EOM are normal.   Neck: Neck supple. Cardiovascular: Normal rate, regular rhythm and normal heart sounds. No murmur heard. Pulmonary/Chest: Effort normal and breath sounds normal. No respiratory distress. He has no wheezes. Musculoskeletal: Normal range of motion. He exhibits no edema or deformity. Neurological: He is alert and oriented to person, place, and time. No cranial nerve deficit. Skin: Skin is warm and dry. No rash noted. He is not diaphoretic. No erythema. Psychiatric: He has a normal mood and affect. Vitals reviewed.        Health Maintenance Due   Topic Date Due    FOBT Q 1 YEAR AGE 50-75  10/23/2016    Influenza Age 5 to Adult  08/01/2019       Assessment and orders:     Encounter Diagnoses     ICD-10-CM ICD-9-CM   1. Migraine with aura and without status migrainosus, not intractable G43.109 346.00   2. Essential hypertension I10 401.9       46 y/o M with new onset migraines, non-intractable, has not picked up Imitrex yet due to cost    1. Migraine with aura and without status migrainosus, not intractable - pt currently without headache, declined option for neurology referral at this time  -START Imitrex 100mg tab 1/2 tab PRN for migraine, pt given good rx coupon, should cost about $9, pt voiced understanding and agreement will  medication  -Pt instructed to keep a log of his headache symptoms and how often he has these headaches and to bring log to clinic at next apt, pt voiced understanding   -Pt to RTC in about 1 month for reevaluation or sooner if symptoms worsen    2. Essential hypertension - now well controlled, /81 today  -Continue Lisinopril 10mg daily         Plan of care:  Discussed diagnoses in detail with patient. Medication risks/benefits/side effects discussed with patient. All of the patient's questions were addressed. The patient understands and agrees with our plan of care. The patient knows to call back if they are unsure of or forget any changes we discussed today or if the symptoms change. The patient received an After-Visit Summary which contains VS, orders, medication list and allergy list. This can be used as a \"mini-medical record\" should they have to seek medical care while out of town. Follow-up and Dispositions    · Return in about 1 month (around 11/8/2019) for Migraines.          Future Appointments   Date Time Provider Mehran Jefferson   10/21/2019  1:00 PM Ezequiel Bailey MD [facility] SCHED       Signed By: Niharika Pappas MD     October 8, 2019

## 2019-10-08 NOTE — PATIENT INSTRUCTIONS
Migraine Headache: Care Instructions  Your Care Instructions  Migraines are painful, throbbing headaches that often start on one side of the head. They may cause nausea and vomiting and make you sensitive to light, sound, or smell. Without treatment, migraines can last from 4 hours to a few days. Medicines can help prevent migraines or stop them after they have started. Your doctor can help you find which ones work best for you. Follow-up care is a key part of your treatment and safety. Be sure to make and go to all appointments, and call your doctor if you are having problems. It's also a good idea to know your test results and keep a list of the medicines you take. How can you care for yourself at home? · Do not drive if you have taken a prescription pain medicine. · Rest in a quiet, dark room until your headache is gone. Close your eyes, and try to relax or go to sleep. Don't watch TV or read. · Put a cold, moist cloth or cold pack on the painful area for 10 to 20 minutes at a time. Put a thin cloth between the cold pack and your skin. · Use a warm, moist towel or a heating pad set on low to relax tight shoulder and neck muscles. · Have someone gently massage your neck and shoulders. · Take your medicines exactly as prescribed. Call your doctor if you think you are having a problem with your medicine. You will get more details on the specific medicines your doctor prescribes. · Be careful not to take pain medicine more often than the instructions allow. You could get worse or more frequent headaches when the medicine wears off. To prevent migraines  · Keep a headache diary so you can figure out what triggers your headaches. Avoiding triggers may help you prevent headaches. Record when each headache began, how long it lasted, and what the pain was like.  (Was it throbbing, aching, stabbing, or dull?) Write down any other symptoms you had with the headache, such as nausea, flashing lights or dark spots, or sensitivity to bright light or loud noise. Note if the headache occurred near your period. List anything that might have triggered the headache. Triggers may include certain foods (chocolate, cheese, wine) or odors, smoke, bright light, stress, or lack of sleep. · If your doctor has prescribed medicine for your migraines, take it as directed. You may have medicine that you take only when you get a migraine and medicine that you take all the time to help prevent migraines. ? If your doctor has prescribed medicine for when you get a headache, take it at the first sign of a migraine, unless your doctor has given you other instructions. ? If your doctor has prescribed medicine to prevent migraines, take it exactly as prescribed. Call your doctor if you think you are having a problem with your medicine. · Find healthy ways to deal with stress. Migraines are most common during or right after stressful times. Take time to relax before and after you do something that has caused a migraine in the past.  · Try to keep your muscles relaxed by keeping good posture. Check your jaw, face, neck, and shoulder muscles for tension. Try to relax them. When you sit at a desk, change positions often. And make sure to stretch for 30 seconds each hour. · Get plenty of sleep and exercise. · Eat meals on a regular schedule. Avoid foods and drinks that often trigger migraines. These include chocolate, alcohol (especially red wine and port), aspartame, monosodium glutamate (MSG), and some additives found in foods (such as hot dogs, mancuso, cold cuts, aged cheeses, and pickled foods). · Limit caffeine. Don't drink too much coffee, tea, or soda. But don't quit caffeine suddenly. That can also give you migraines. · Do not smoke or allow others to smoke around you. If you need help quitting, talk to your doctor about stop-smoking programs and medicines. These can increase your chances of quitting for good.   · If you are taking birth control pills or hormone therapy, talk to your doctor about whether they are triggering your migraines. When should you call for help? Call 911 anytime you think you may need emergency care. For example, call if:    · You have signs of a stroke. These may include:  ? Sudden numbness, paralysis, or weakness in your face, arm, or leg, especially on only one side of your body. ? Sudden vision changes. ? Sudden trouble speaking. ? Sudden confusion or trouble understanding simple statements. ? Sudden problems with walking or balance. ? A sudden, severe headache that is different from past headaches.    Call your doctor now or seek immediate medical care if:    · You have new or worse nausea and vomiting.     · You have a new or higher fever.     · Your headache gets much worse.    Watch closely for changes in your health, and be sure to contact your doctor if:    · You are not getting better after 2 days (48 hours). Where can you learn more? Go to http://bhaskar-shavonne.info/. Enter E398 in the search box to learn more about \"Migraine Headache: Care Instructions. \"  Current as of: March 28, 2019  Content Version: 12.2  © 0380-0579 Healthwise, [employer]. Care instructions adapted under license by [employer] (which disclaims liability or warranty for this information). If you have questions about a medical condition or this instruction, always ask your healthcare professional. [address] any warranty or liability for your use of this information.

## 2019-10-08 NOTE — PROGRESS NOTES
1. Have you been to the ER, urgent care clinic, or been hospitalized since your last visit? No     2. Have you seen or consulted any other health care providers outside of the [address] [place] [address] since your last visit? No     Reviewed record in preparation for visit and have necessary documentation  Goals that were addressed and/or need to be completed during or after this appointment include   Health Maintenance Due   Topic Date Due    FOBT Q 1 YEAR AGE 50-75  10/23/2016    Influenza Age 5 to Adult  08/01/2019       I have received verbal consent from Neal Olvera to discuss any/all medical information while others present in the room.

## 2019-12-02 ENCOUNTER — OFFICE VISIT (OUTPATIENT)
Dept: FAMILY MEDICINE CLINIC | Age: 54
End: 2019-12-02

## 2019-12-02 VITALS
SYSTOLIC BLOOD PRESSURE: 143 MMHG | TEMPERATURE: 98.8 F | WEIGHT: 181 LBS | DIASTOLIC BLOOD PRESSURE: 93 MMHG | OXYGEN SATURATION: 98 % | RESPIRATION RATE: 16 BRPM | BODY MASS INDEX: 25.91 KG/M2 | HEART RATE: 92 BPM | HEIGHT: 70 IN

## 2019-12-02 DIAGNOSIS — I10 ESSENTIAL HYPERTENSION: ICD-10-CM

## 2019-12-02 DIAGNOSIS — R07.81 PLEURITIC CHEST PAIN: ICD-10-CM

## 2019-12-02 DIAGNOSIS — R05.9 COUGH: Primary | ICD-10-CM

## 2019-12-02 RX ORDER — MELOXICAM 7.5 MG/1
7.5 TABLET ORAL DAILY
Qty: 30 TAB | Refills: 1 | Status: SHIPPED | OUTPATIENT
Start: 2019-12-02 | End: 2020-01-29

## 2019-12-02 RX ORDER — BENZONATATE 200 MG/1
200 CAPSULE ORAL
Qty: 30 CAP | Refills: 1 | Status: SHIPPED | OUTPATIENT
Start: 2019-12-02 | End: 2021-09-13

## 2019-12-02 RX ORDER — AMLODIPINE BESYLATE 5 MG/1
5 TABLET ORAL DAILY
Qty: 30 TAB | Refills: 1 | Status: SHIPPED | OUTPATIENT
Start: 2019-12-02 | End: 2019-12-05 | Stop reason: SDDI

## 2019-12-02 NOTE — PATIENT INSTRUCTIONS
- Stop the Lisinopril, Start Amlodipine daily for Blood pressure. - Tessalon: Take for cough up to 3 times a day - Mobic: Take for chest pain,  Take 1 tablet daily, Can continue Tylenol for chest pain as well, DO NOT TAKE IBUPROFEN/MOTRIN WHILE TAKING MOBIC 
- Stop the Mucinex - Cepachol for throat pain

## 2019-12-02 NOTE — PROGRESS NOTES
1. Have you been to the ER, urgent care clinic, or been hospitalized since your last visit? No     2. Have you seen or consulted any other health care providers outside of the [address] [place] [address] since your last visit? No     Reviewed record in preparation for visit and have necessary documentation  Goals that were addressed and/or need to be completed during or after this appointment include   Health Maintenance Due   Topic Date Due    FOBT Q 1 YEAR AGE 50-75  10/23/2016    Influenza Age 5 to Adult  08/01/2019       I have received verbal consent from Adele Timmons to discuss any/all medical information while others present in the room.

## 2019-12-02 NOTE — PROGRESS NOTES
[facility]    History of Present Illness:   Patrice Castillo is a 49 y.o. male with history of HTN, Anxiety  CC: Coughing with chest pain  History provided by patient and Records    HPI:  Cough Evaluation:  Patient complaining of Acute cough. - Duration: 5 days  - Frequency:Continuous during the day. Cough is worst all the time. - Quality: dry  - Severity: moderate  - Associated Symptoms: change in voice  - Known Triggers: nothing  - Alleviating factors: none  - Current Medications: none    - Pulmonary History: None  - Is patient on ACE-I? Yes  - History of CHF? No   - History of GERD? No   - History of Post Nasal Drip? No   - Current Tobacco use: never  - History of toxin exposures? No   - Previous imaging studies: None      Health Maintenance  Health Maintenance Due   Topic Date Due    FOBT Q 1 YEAR AGE 50-75  10/23/2016    Influenza Age 5 to Adult  08/01/2019       Past Medical, Family, and Social History:     Current Outpatient Medications on File Prior to Visit   Medication Sig Dispense Refill    guaiFENesin (MUCINEX) 1,200 mg Ta12 ER tablet Take 1,200 mg by mouth two (2) times a day.  lisinopril (PRINIVIL, ZESTRIL) 10 mg tablet Take 1 Tab by mouth daily. 30 Tab 5    SUMAtriptan (IMITREX) 100 mg tablet Take one half tab as needed for migraine. May repeat 2 hours later if no improvement. Do not take more than two half-tablets in one day. 5 Tab 0     No current facility-administered medications on file prior to visit.         Patient Active Problem List   Diagnosis Code    Anxiety F41.9    Heart palpitations R00.2    Essential hypertension I10    Bilateral low back pain without sciatica M54.5       Social History     Socioeconomic History    Marital status: SINGLE     Spouse name: Not on file    Number of children: Not on file    Years of education: Not on file    Highest education level: Not on file   Occupational History    Not on file   Social Needs    Financial resource strain: Not on file    Food insecurity:     Worry: Not on file     Inability: Not on file    Transportation needs:     Medical: Not on file     Non-medical: Not on file   Tobacco Use    Smoking status: Never Smoker    Smokeless tobacco: Never Used   Substance and Sexual Activity    Alcohol use: No    Drug use: No    Sexual activity: Not on file   Lifestyle    Physical activity:     Days per week: Not on file     Minutes per session: Not on file    Stress: Not on file   Relationships    Social connections:     Talks on phone: Not on file     Gets together: Not on file     Attends Yazidi service: Not on file     Active member of club or organization: Not on file     Attends meetings of clubs or organizations: Not on file     Relationship status: Not on file    Intimate partner violence:     Fear of current or ex partner: Not on file     Emotionally abused: Not on file     Physically abused: Not on file     Forced sexual activity: Not on file   Other Topics Concern    Not on file   Social History Narrative    Not on file       Review of Systems   Review of Systems   Constitutional: Negative for chills and fever. Respiratory: Positive for cough. Negative for sputum production. Cardiovascular: Positive for chest pain. Gastrointestinal: Positive for diarrhea. Negative for abdominal pain, nausea and vomiting. Objective:     Visit Vitals  /88   Pulse 92   Temp 98.8 °F (37.1 °C) (Oral)   Resp 16   Ht 5' 10\" (1.778 m)   Wt 181 lb (82.1 kg)   SpO2 98%   BMI 25.97 kg/m²        Physical Exam  Vitals signs and nursing note reviewed. Constitutional:       Appearance: Normal appearance. HENT:      Head: Normocephalic and atraumatic. Right Ear: Tympanic membrane normal.      Left Ear: Tympanic membrane normal.      Nose: Nose normal. No congestion or rhinorrhea. Mouth/Throat:      Mouth: Mucous membranes are moist.      Pharynx: Oropharynx is clear.  Posterior oropharyngeal erythema present. No oropharyngeal exudate. Neck:      Musculoskeletal: Normal range of motion and neck supple. Cardiovascular:      Rate and Rhythm: Normal rate and regular rhythm. Pulses: Normal pulses. Heart sounds: Normal heart sounds. No murmur. No friction rub. No gallop. Pulmonary:      Effort: Pulmonary effort is normal.      Breath sounds: Normal breath sounds. Chest:      Chest wall: Tenderness present. Abdominal:      General: Abdomen is flat. Bowel sounds are normal.      Palpations: Abdomen is soft. Musculoskeletal:         General: No swelling. Neurological:      Mental Status: He is alert. Pertinent Labs/Studies:      Assessment and orders:       ICD-10-CM ICD-9-CM    1. Cough R05 786.2 benzonatate (TESSALON) 200 mg capsule   2. Pleuritic chest pain R07.81 786.52 meloxicam (MOBIC) 7.5 mg tablet   3. Essential hypertension I10 401.9 amLODIPine (NORVASC) 5 mg tablet     Diagnoses and all orders for this visit:    1. Cough: Multiple possible etiologies. Acute Cough without other pulmonary disease component. Possibly secondary to ACE or viral.  Starting with symptomatic therapies and maintenance of potential exacerbating conditions. Stopping lisinopril now. See orders for further details. -     benzonatate (TESSALON) 200 mg capsule; Take 1 Cap by mouth three (3) times daily as needed for Cough. 2. Pleuritic chest pain: Secondary to coughing, no red flags at this time  -     meloxicam (MOBIC) 7.5 mg tablet; Take 1 Tab by mouth daily. 3. Essential hypertension: Replacing Lisinopril with amlodipine  -     amLODIPine (NORVASC) 5 mg tablet; Take 1 Tab by mouth daily. Indications: high blood pressure      Follow-up and Dispositions    · Return if symptoms worsen or fail to improve. I have discussed the diagnosis with the patient and the intended plan as seen in the above orders. Social history, medical history, and labs were reviewed.   The patient has received an after-visit summary and questions were answered concerning future plans. I have discussed medication side effects and warnings with the patient as well.     MD ARTHUR Arellano & LISBETH ROSENTHAL [facility]  12/02/19

## 2019-12-02 NOTE — LETTER
NOTIFICATION RETURN TO WORK / SCHOOL 
 
12/2/2019 11:15 AM 
 
Mr. Eric John [address] 
[address] To Whom It May Concern: 
 
Eric John is currently under the care of Israel Álvarez. Patient is being treated for Cough and associated diarrhea. Please excuse his absence on 12/2/19-12/3/19. He will return on 12/5/19. If there are questions or concerns please have the patient contact our office. Sincerely, Jhon Sigala MD

## 2019-12-05 ENCOUNTER — OFFICE VISIT (OUTPATIENT)
Dept: FAMILY MEDICINE CLINIC | Age: 54
End: 2019-12-05

## 2019-12-05 VITALS
OXYGEN SATURATION: 99 % | HEART RATE: 73 BPM | DIASTOLIC BLOOD PRESSURE: 97 MMHG | BODY MASS INDEX: 26.05 KG/M2 | WEIGHT: 182 LBS | HEIGHT: 70 IN | TEMPERATURE: 97.6 F | SYSTOLIC BLOOD PRESSURE: 157 MMHG | RESPIRATION RATE: 20 BRPM

## 2019-12-05 DIAGNOSIS — I10 ESSENTIAL HYPERTENSION: ICD-10-CM

## 2019-12-05 DIAGNOSIS — J02.0 PHARYNGITIS DUE TO STREPTOCOCCUS SPECIES: Primary | ICD-10-CM

## 2019-12-05 LAB
QUICKVUE INFLUENZA TEST: NEGATIVE
S PYO AG THROAT QL: POSITIVE
VALID INTERNAL CONTROL?: YES
VALID INTERNAL CONTROL?: YES

## 2019-12-05 RX ORDER — AZITHROMYCIN 250 MG/1
TABLET, FILM COATED ORAL
Qty: 6 TAB | Refills: 0 | Status: SHIPPED | OUTPATIENT
Start: 2019-12-05 | End: 2019-12-10

## 2019-12-05 RX ORDER — ALBUTEROL SULFATE 90 UG/1
1 AEROSOL, METERED RESPIRATORY (INHALATION)
Qty: 1 INHALER | Refills: 0 | Status: SHIPPED | OUTPATIENT
Start: 2019-12-05 | End: 2021-09-13

## 2019-12-05 NOTE — PROGRESS NOTES
1. Have you been to the ER, urgent care clinic since your last visit? Hospitalized since your last visit? No    2. Have you seen or consulted any other health care providers outside of the [address] [place] [address] since your last visit? Include any pap smears or colon screening.  No  Reviewed record in preparation for visit and have necessary documentation  Pt did not bring medication to office visit for review  opportunity was given for questions  Goals that were addressed and/or need to be completed during or after this appointment include    Health Maintenance Due   Topic Date Due    FOBT Q 1 YEAR AGE 50-75  10/23/2016    Influenza Age 5 to Adult  08/01/2019

## 2019-12-05 NOTE — PROGRESS NOTES
Subjective  CC: Js Smith is an 49 y.o. male who presents after seeing Dr. Saskia Baum 3 days ago for a cough. He was prescribed Tessalon, Mobic and switched lisinopril to amlodipine however the pt has not filled any of these medications. He disagreed with he previous provider's recommendations. The pt continues to have thick productive sputum, pleuritic type chest pain with coughing and HA. He has tried tylenol but stopped mucinex after someone told him he should not take it. He denies nausea, vomiting, constipation. Allergies - reviewed: Allergies   Allergen Reactions    Penicillin G Swelling     Tongue swelling         Medications - reviewed:   Current Outpatient Medications   Medication Sig    azithromycin (ZITHROMAX) 250 mg tablet Take 2 tablets today, then take 1 tablet daily    albuterol (PROVENTIL HFA, VENTOLIN HFA, PROAIR HFA) 90 mcg/actuation inhaler Take 1 Puff by inhalation every four (4) hours as needed for Wheezing.  guaiFENesin (MUCINEX) 1,200 mg Ta12 ER tablet Take 1,200 mg by mouth two (2) times a day.  SUMAtriptan (IMITREX) 100 mg tablet Take one half tab as needed for migraine. May repeat 2 hours later if no improvement. Do not take more than two half-tablets in one day.  lisinopril (PRINIVIL, ZESTRIL) 10 mg tablet Take 1 Tab by mouth daily.  meloxicam (MOBIC) 7.5 mg tablet Take 1 Tab by mouth daily.  benzonatate (TESSALON) 200 mg capsule Take 1 Cap by mouth three (3) times daily as needed for Cough. No current facility-administered medications for this visit. Past Medical History - reviewed:  No past medical history on file. Immunizations - reviewed: There is no immunization history on file for this patient. ROS  Review of Systems : Review of Systems   Constitutional: Positive for chills. Negative for fever and malaise/fatigue. HENT: Positive for congestion and sore throat. Negative for sinus pain.     Eyes: Negative for blurred vision and pain. Respiratory: Positive for cough, sputum production and wheezing. Negative for shortness of breath. Cardiovascular: Positive for chest pain. Negative for palpitations and leg swelling. Gastrointestinal: Negative for abdominal pain, diarrhea, nausea and vomiting. Genitourinary: Negative for dysuria and frequency. Musculoskeletal: Negative for falls and myalgias. Skin: Negative for itching and rash. Neurological: Positive for headaches. Negative for dizziness and focal weakness. Endo/Heme/Allergies: Negative for polydipsia. Does not bruise/bleed easily. Psychiatric/Behavioral: Negative for depression. The patient does not have insomnia. Physical Exam  Visit Vitals  BP (!) 157/97 (BP 1 Location: Left arm, BP Patient Position: Sitting)   Pulse 73   Temp 97.6 °F (36.4 °C) (Oral)   Resp 20   Ht 5' 10\" (1.778 m)   Wt 182 lb (82.6 kg)   SpO2 99%   BMI 26.11 kg/m²       General appearance - Alert, NAD. Head: Atraumatic. Normocephalic. No lymphadenopathy  Eyes: EOMI. Sclera white. Ears: Hearing grossly normal. TM non erythematous with no effusion   Nose: Nares patent, no polyps  Throat: Erythematous pharynx without exudates. Respiratory - Wheeze on auscultation with cough. Otherwise CTAB  Heart - Normal rate, regular rhythm. No m/r/r  Abdomen - Soft, non tender. Non distended. Neurological - No focal deficits. Speech normal.   Musculoskeletal - Normal ROM, Gait normal.    Extremities - No LE edema. Distal pulses intact  Skin - normal coloration and normal turgor. No cyanosis, no rash. Assessment/Plan  1. Pharyngitis due to Streptococcus species- Pt with wheeze on physical exam. Sent albuterol Rx however pt states will most likely use his wife's inhaler.   - AMB POC RAPID STREP A- POSITIVE  - AMB POC RAPID INFLUENZA TEST  - azithromycin (ZITHROMAX) 250 mg tablet; Take 2 tablets today, then take 1 tablet daily  Dispense: 6 Tab;  Refill: 0  - albuterol (PROVENTIL HFA, VENTOLIN HFA, PROAIR HFA) 90 mcg/actuation inhaler; Take 1 Puff by inhalation every four (4) hours as needed for Wheezing. Dispense: 1 Inhaler; Refill: 0    2. Essential hypertension- Previous provide switched from Lisinopril to Amlodipine due to cough. Pt prefers to stay on lisinopril. Acute symptoms best explained by strep pharyngitis over ACE-I intolerance. - Discontinued amlodipine.   - Continue Lisinopril as previously prescribed    I have discussed the aforementioned diagnoses and plan with the patient in detail. I have provided information in person and/or in AVS. All questions answered prior to discharge.     Dorie Orosco MD  Family Medicine Resident  PGY 1

## 2019-12-05 NOTE — LETTER
NOTIFICATION RETURN TO WORK / SCHOOL 
 
12/5/2019 3:03 PM 
 
Mr. Bg Chapa [address] 
[address] To Whom It May Concern: 
 
Bg Chapa is currently under the care of Israel Álvarez. He was unfit for work 12-4-12/8. He will return to work/school on: 12/8/19 If there are questions or concerns please have the patient contact our office.  
 
 
 
Sincerely, 
 
 
Carmela Lopez MD

## 2019-12-06 NOTE — PROGRESS NOTES
I saw and evaluated the patient with the resident, performing the key elements of the exam and service. I discussed the findings, assessment and plan with the resident and agree with the resident's findings and plan as documented in the resident's note. Cody Hood M.D.

## 2020-01-29 ENCOUNTER — OFFICE VISIT (OUTPATIENT)
Dept: FAMILY MEDICINE CLINIC | Age: 55
End: 2020-01-29

## 2020-01-29 VITALS
SYSTOLIC BLOOD PRESSURE: 166 MMHG | HEART RATE: 82 BPM | RESPIRATION RATE: 20 BRPM | WEIGHT: 186 LBS | DIASTOLIC BLOOD PRESSURE: 97 MMHG | OXYGEN SATURATION: 97 % | BODY MASS INDEX: 26.63 KG/M2 | HEIGHT: 70 IN | TEMPERATURE: 98.5 F

## 2020-01-29 DIAGNOSIS — I10 ESSENTIAL HYPERTENSION: ICD-10-CM

## 2020-01-29 DIAGNOSIS — A49.1 STREPTOCOCCAL INFECTION: ICD-10-CM

## 2020-01-29 DIAGNOSIS — R07.81 PLEURITIC CHEST PAIN: Primary | ICD-10-CM

## 2020-01-29 RX ORDER — LISINOPRIL 10 MG/1
10 TABLET ORAL DAILY
Qty: 30 TAB | Refills: 5 | Status: SHIPPED | OUTPATIENT
Start: 2020-01-29 | End: 2020-12-21 | Stop reason: SDUPTHER

## 2020-01-29 RX ORDER — AZITHROMYCIN 250 MG/1
TABLET, FILM COATED ORAL
Qty: 6 TAB | Refills: 0 | Status: SHIPPED | OUTPATIENT
Start: 2020-01-29 | End: 2020-02-03

## 2020-01-29 RX ORDER — DICLOFENAC SODIUM 75 MG/1
75 TABLET, DELAYED RELEASE ORAL 2 TIMES DAILY
Qty: 60 TAB | Refills: 1 | Status: SHIPPED | OUTPATIENT
Start: 2020-01-29 | End: 2021-09-13

## 2020-01-29 NOTE — PROGRESS NOTES
1. Have you been to the ER, urgent care clinic since your last visit? Hospitalized since your last visit? No    2. Have you seen or consulted any other health care providers outside of the [address] [place] [address] since your last visit? Include any pap smears or colon screening.  No  Reviewed record in preparation for visit and have necessary documentation  Pt did not bring medication to office visit for review    Goals that were addressed and/or need to be completed during or after this appointment include     Health Maintenance Due   Topic Date Due    Shingrix Vaccine Age 50> (1 of 2) 10/23/2016    FOBT Q 1 YEAR AGE 50-75  10/23/2016    Influenza Age 9 to Adult  08/01/2019

## 2020-01-31 NOTE — PATIENT INSTRUCTIONS
Learning About High Blood Pressure What is high blood pressure? Blood pressure is a measure of how hard the blood pushes against the walls of your arteries. It's normal for blood pressure to go up and down throughout the day, but if it stays up, you have high blood pressure. Another name for high blood pressure is hypertension. Two numbers tell you your blood pressure. The first number is the systolic pressure. It shows how hard the blood pushes when your heart is pumping. The second number is the diastolic pressure. It shows how hard the blood pushes between heartbeats, when your heart is relaxed and filling with blood. Your doctor will give you a goal for your blood pressure. Your goal will be based on your health and your age. High blood pressure (hypertension) means that the top number stays high, or the bottom number stays high, or both. High blood pressure increases the risk of stroke, heart attack, and other problems. You and your doctor will talk about your risks of these problems based on your blood pressure. What happens when you have high blood pressure? · Blood flows through your arteries with too much force. Over time, this damages the walls of your arteries. But you can't feel it. High blood pressure usually doesn't cause symptoms. · Fat and calcium start to build up in your arteries. This buildup is called plaque. Plaque makes your arteries narrower and stiffer. Blood can't flow through them as easily. · This lack of good blood flow starts to damage some of the organs in your body. This can lead to problems such as coronary artery disease and heart attack, heart failure, stroke, kidney failure, and eye damage. How can you prevent high blood pressure? · Stay at a healthy weight. · Try to limit how much sodium you eat to less than 2,300 milligrams (mg) a day. If you limit your sodium to 1,500 mg a day, you can lower your blood pressure even more. ? Buy foods that are labeled \"unsalted,\" \"sodium-free,\" or \"low-sodium. \" Foods labeled \"reduced-sodium\" and \"light sodium\" may still have too much sodium. ? Flavor your food with garlic, lemon juice, onion, vinegar, herbs, and spices instead of salt. Do not use soy sauce, steak sauce, onion salt, garlic salt, mustard, or ketchup on your food. ? Use less salt (or none) when recipes call for it. You can often use half the salt a recipe calls for without losing flavor. · Be physically active. Get at least 30 minutes of exercise on most days of the week. Walking is a good choice. You also may want to do other activities, such as running, swimming, cycling, or playing tennis or team sports. · Limit alcohol to 2 drinks a day for men and 1 drink a day for women. · Eat plenty of fruits, vegetables, and low-fat dairy products. Eat less saturated and total fats. How is high blood pressure treated? · Your doctor will suggest making lifestyle changes to help your heart. For example, your doctor may ask you to eat healthy foods, quit smoking, lose extra weight, and be more active. · If lifestyle changes don't help enough, your doctor may recommend that you take medicine. · When blood pressure is very high, medicines are needed to lower it. Follow-up care is a key part of your treatment and safety. Be sure to make and go to all appointments, and call your doctor if you are having problems. It's also a good idea to know your test results and keep a list of the medicines you take. Where can you learn more? Go to http://bhaskar-shavonne.info/. Enter P501 in the search box to learn more about \"Learning About High Blood Pressure. \" Current as of: April 9, 2019 Content Version: 12.2 © 3923-7007 [employer]. Care instructions adapted under license by [employer] (which disclaims liability or warranty for this information).  If you have questions about a medical condition or this instruction, always ask your healthcare professional. [place] [address] any warranty or liability for your use of this information.

## 2020-01-31 NOTE — PROGRESS NOTES
Patient: Gabbie Palacio MRN: [id number]  SSN: [id number]    YOB: 1965  Age: 49 y.o. Sex: male        Subjective:     Chief Complaint   Patient presents with    Chest Pain     right lung pain       HPI: he is a 49y.o. year old male who presents with complaint of right sided chest pain. Patient with hx of HTN and has not been on medication for months. He was diagnosed with strep a month ago and never filled the rx for an antibiotic. Patient denies HA, dizziness, SOB, CP, abdominal pain, dysuria, weakness or paresthesia. He works as a cook. Has hx of palpitations. Risks of uncontrolled HTN discussed at length. Importance of regular follow up and monitoring of BP and lab results discussed as well. Also advised patient to apply for Care Card. Patient expressed understanding. Encounter Diagnoses   Name Primary?  Pleuritic chest pain Yes    Essential hypertension     Streptococcal infection        BP Readings from Last 3 Encounters:   01/29/20 (!) 166/97   12/05/19 (!) 157/97   12/02/19 (!) 143/93       Wt Readings from Last 3 Encounters:   01/29/20 186 lb (84.4 kg)   12/05/19 182 lb (82.6 kg)   12/02/19 181 lb (82.1 kg)     Body mass index is 26.69 kg/m². Patient advised to log blood pressures at home 2-3 times weekly and bring to next visit. Call office as soon as possible if BP's over 140/90 on multiple occasions or with symptoms of dizziness, chest pain, shortness of breath, headache or ankle swelling. Our goal is to normalize the blood pressure to decrease the risks of strokes and heart attacks. The patient is in agreement with the plan. Current and past medical information:    Current Medications after this visit[de-identified]     Current Outpatient Medications   Medication Sig    azithromycin (ZITHROMAX) 250 mg tablet Take 2 tablets today, then take 1 tablet daily    diclofenac EC (VOLTAREN) 75 mg EC tablet Take 1 Tab by mouth two (2) times a day.     lisinopril (PRINIVIL, ZESTRIL) 10 mg tablet Take 1 Tab by mouth daily.  albuterol (PROVENTIL HFA, VENTOLIN HFA, PROAIR HFA) 90 mcg/actuation inhaler Take 1 Puff by inhalation every four (4) hours as needed for Wheezing.  guaiFENesin (MUCINEX) 1,200 mg Ta12 ER tablet Take 1,200 mg by mouth two (2) times a day.  benzonatate (TESSALON) 200 mg capsule Take 1 Cap by mouth three (3) times daily as needed for Cough. No current facility-administered medications for this visit. Patient Active Problem List    Diagnosis Date Noted    Essential hypertension 09/16/2015    Bilateral low back pain without sciatica 09/16/2015    Anxiety 06/07/2012    Heart palpitations 06/07/2012       History reviewed. No pertinent past medical history. Allergies   Allergen Reactions    Penicillin G Swelling     Tongue swelling       History reviewed. No pertinent surgical history.     Social History     Socioeconomic History    Marital status: SINGLE     Spouse name: Not on file    Number of children: Not on file    Years of education: Not on file    Highest education level: Not on file   Tobacco Use    Smoking status: Never Smoker    Smokeless tobacco: Never Used   Substance and Sexual Activity    Alcohol use: No    Drug use: No         Objective:     Review of Systems:  Constitutional: Negative for fatigue or malaise  Derm: Negative for rash or lesion  HEENT: Negative for acute hearing or vision changes  Cardiovascular: Negative for dizziness, chest pain or palpitations  Respiratory: Negative for cough, wheezing or SOB  Gastreintestinal: Negative for nausea or abdominal pain  Genital/urinary: Negative for dysuria or voiding dysfunction  Muscoloskeletal: Negative for acute myalgias or arthralgias   Neurological: Negative for headache, weakness or paresthesia  Psychological: Negative for depression or anxiety      Vitals:    01/29/20 1315   BP: (!) 166/97   Pulse: 82   Resp: 20   Temp: 98.5 °F (36.9 °C)   TempSrc: Oral   SpO2: 97%   Weight: 186 lb (84.4 kg)   Height: 5' 10\" (1.778 m)      Body mass index is 26.69 kg/m². Physical Exam:  Constitutional: well developed, well nourished, in no acute distress  Skin: warm and dry  Head: normocephalic, atraumatic  Eyes: sclera clear, EOMI  Neck: normal range of motion  Cardiovascular: normal S1, S2, regular rate and rhythm  Respiratory: clear to auscultation bilaterally with symmetrical effort  Extremities: full range of motion, no edema  Neurology: normal strength and sensation  Psych: active, alert and oriented, affect appropriate       Health Maintenance Due   Topic Date Due    Shingrix Vaccine Age 49> (1 of 2) 10/23/2016    FOBT Q 1 YEAR AGE 50-75  10/23/2016    Influenza Age 5 to Adult  08/01/2019       Risk, benefits and potential costs of recommended health maintenance discussed. Patient expressed understanding and declined at this time. Assessment and orders:       ICD-10-CM ICD-9-CM    1. Pleuritic chest pain R07.81 786.52 diclofenac EC (VOLTAREN) 75 mg EC tablet   2. Essential hypertension I10 401.9 lisinopril (PRINIVIL, ZESTRIL) 10 mg tablet   3. Streptococcal infection A49.1 041.00 azithromycin (ZITHROMAX) 250 mg tablet         Plan of care:  Diagnoses were discussed in detail with patient. Medication risks/benefits/side effects discussed with patient. Risks of non-compliance with medication and uncontrolled BP discussed at length with patient. These include, but are not limited to heart attack, stroke and sudden death. Patient expressed understanding. Strongly advised patient to stop all use of alcohol. All of the patient's questions were addressed and answered to apparent satisfaction. The patient understands and agrees with our plan of care. The patient knows to call back if they have questions about the plan of care or if symptoms change. The patient received an After-Visit Summary which contains VS, diagnoses, orders, allergy and medication lists.       Patient Care Team:  Jono Lopez MD as PCP - General (Family Practice)  Jono Lopez MD as PCP - REHABILITATION [facility] Empaneled Provider        No future appointments.     Signed By: Svetlana Lyons MD     January 31, 2020

## 2020-11-24 DIAGNOSIS — I10 ESSENTIAL HYPERTENSION: ICD-10-CM

## 2020-11-25 RX ORDER — LISINOPRIL 10 MG/1
TABLET ORAL
Qty: 30 TAB | Refills: 0 | OUTPATIENT
Start: 2020-11-25

## 2020-12-03 RX ORDER — LISINOPRIL 10 MG/1
10 TABLET ORAL DAILY
Qty: 30 TAB | Refills: 5 | Status: CANCELLED | OUTPATIENT
Start: 2020-12-03

## 2020-12-15 ENCOUNTER — TELEPHONE (OUTPATIENT)
Dept: FAMILY MEDICINE CLINIC | Age: 55
End: 2020-12-15

## 2020-12-17 ENCOUNTER — VIRTUAL VISIT (OUTPATIENT)
Dept: FAMILY MEDICINE CLINIC | Age: 55
End: 2020-12-17
Payer: MEDICAID

## 2020-12-17 DIAGNOSIS — I10 ESSENTIAL HYPERTENSION: Primary | ICD-10-CM

## 2020-12-17 PROCEDURE — 99442 PR PHYS/QHP TELEPHONE EVALUATION 11-20 MIN: CPT | Performed by: STUDENT IN AN ORGANIZED HEALTH CARE EDUCATION/TRAINING PROGRAM

## 2020-12-18 NOTE — PROGRESS NOTES
[address] Medicine Residency Attending Addendum:  Dr. Shemar West MD,  the patient and I were not physically present during this encounter. The resident and I are concurrently monitoring the patient care through appropriate telecommunication technology. I discussed the findings, assessment and plan with the resident and agree with the resident's findings and plan as documented in the resident's note.       Rigoberto Oconnor MD

## 2020-12-21 ENCOUNTER — TELEPHONE (OUTPATIENT)
Dept: FAMILY MEDICINE CLINIC | Age: 55
End: 2020-12-21

## 2020-12-21 DIAGNOSIS — I10 ESSENTIAL HYPERTENSION: ICD-10-CM

## 2020-12-21 RX ORDER — AMLODIPINE BESYLATE 5 MG/1
5 TABLET ORAL DAILY
Qty: 30 TAB | Refills: 2 | Status: SHIPPED | OUTPATIENT
Start: 2020-12-21 | End: 2021-09-13

## 2020-12-21 RX ORDER — LISINOPRIL 20 MG/1
20 TABLET ORAL DAILY
Qty: 30 TAB | Refills: 2 | Status: SHIPPED | OUTPATIENT
Start: 2020-12-21 | End: 2021-01-05 | Stop reason: DRUGHIGH

## 2020-12-21 NOTE — TELEPHONE ENCOUNTER
Pt walked in today for lab work and stated \"Dr. Karis Haines said I needed to get my blood pressure checked and labs before he would refill my medication. \" Blood pressure was 187/116. I went to Dr. Misty Franco to give the reading from the BP. Dr. Misty Franco refilled 2 medications but stated he should come in for a follow up appt after the new year to make adjustments to medications as needed. Appt scheduled for in person appt on Jan 5th. Pt made aware he needs to come to the doctor at least every 6 months to follow up for his BP and not just come when he is sick.

## 2020-12-22 DIAGNOSIS — E78.5 HYPERLIPIDEMIA LDL GOAL <100: Primary | ICD-10-CM

## 2020-12-22 RX ORDER — ATORVASTATIN CALCIUM 40 MG/1
40 TABLET, FILM COATED ORAL DAILY
Qty: 30 TAB | Refills: 3 | Status: SHIPPED | OUTPATIENT
Start: 2020-12-22 | End: 2021-01-05 | Stop reason: SDUPTHER

## 2021-01-05 ENCOUNTER — OFFICE VISIT (OUTPATIENT)
Dept: FAMILY MEDICINE CLINIC | Age: 56
End: 2021-01-05
Payer: MEDICAID

## 2021-01-05 VITALS
OXYGEN SATURATION: 100 % | DIASTOLIC BLOOD PRESSURE: 99 MMHG | BODY MASS INDEX: 27.4 KG/M2 | HEART RATE: 71 BPM | SYSTOLIC BLOOD PRESSURE: 150 MMHG | TEMPERATURE: 96.4 F | WEIGHT: 191.4 LBS | HEIGHT: 70 IN | RESPIRATION RATE: 18 BRPM

## 2021-01-05 DIAGNOSIS — I10 ESSENTIAL HYPERTENSION: Primary | ICD-10-CM

## 2021-01-05 DIAGNOSIS — E78.5 HYPERLIPIDEMIA LDL GOAL <100: ICD-10-CM

## 2021-01-05 PROCEDURE — 99214 OFFICE O/P EST MOD 30 MIN: CPT | Performed by: STUDENT IN AN ORGANIZED HEALTH CARE EDUCATION/TRAINING PROGRAM

## 2021-01-05 RX ORDER — ATORVASTATIN CALCIUM 40 MG/1
40 TABLET, FILM COATED ORAL DAILY
Qty: 30 TAB | Refills: 5 | Status: SHIPPED | OUTPATIENT
Start: 2021-01-05 | End: 2021-01-05

## 2021-01-05 RX ORDER — OLANZAPINE 15 MG/1
20 TABLET ORAL
COMMUNITY
Start: 2020-12-07 | End: 2022-02-01 | Stop reason: SDUPTHER

## 2021-01-05 RX ORDER — TRAZODONE HYDROCHLORIDE 100 MG/1
100 TABLET ORAL
COMMUNITY
Start: 2020-10-20 | End: 2022-02-01

## 2021-01-05 RX ORDER — ATORVASTATIN CALCIUM 40 MG/1
40 TABLET, FILM COATED ORAL
Qty: 30 TAB | Refills: 5 | Status: SHIPPED | OUTPATIENT
Start: 2021-01-05 | End: 2021-09-13

## 2021-01-05 RX ORDER — RISPERIDONE 4 MG/1
4 TABLET, FILM COATED ORAL 2 TIMES DAILY
COMMUNITY
Start: 2020-12-07 | End: 2022-02-01 | Stop reason: SDUPTHER

## 2021-01-05 RX ORDER — SERTRALINE HYDROCHLORIDE 100 MG/1
150 TABLET, FILM COATED ORAL DAILY
COMMUNITY
Start: 2020-12-07 | End: 2022-02-01 | Stop reason: SDUPTHER

## 2021-01-05 RX ORDER — LISINOPRIL 40 MG/1
40 TABLET ORAL DAILY
Qty: 30 TAB | Refills: 5 | Status: SHIPPED | OUTPATIENT
Start: 2021-01-05 | End: 2021-09-24

## 2021-01-05 NOTE — PROGRESS NOTES
1. Have you been to the ER, urgent care clinic since your last visit? Hospitalized since your last visit? No    2. Have you seen or consulted any other health care providers outside of the [address] [place] [address] since your last visit? Include any pap smears or colon screening. No    Reviewed record in preparation for visit and have necessary documentation  Goals that were addressed and/or need to be completed during or after this appointment include     Health Maintenance Due   Topic Date Due    DTaP/Tdap/Td series (1 - Tdap) 10/23/1987    Shingrix Vaccine Age 50> (1 of 2) 10/23/2016    Colorectal Cancer Screening Combo  10/23/2016    Flu Vaccine (1) 09/01/2020       Patient is accompanied by self I have received verbal consent from Gavino Wolfe to discuss any/all medical information while they are present in the room.

## 2021-01-05 NOTE — PROGRESS NOTES
Subjective  CC: Kimberley Vidal is an 48 y.o. male who presents regarding HTN. HTN: Recently restarted Lisinopril at a higher dose and started Amlodipine. Endorses compliance of both and denies SEs such as angioedema, leg swelling or cough. Denies cp, dizziness, lightheadedness or SoB. HLD- Based off first BP from visit and recent lipid panel, ASCVD risk 14.6%. Started Lipitor 40mg prior but unable to get a hold of pt to insure receipt of med. Allergies - reviewed: Allergies   Allergen Reactions    Penicillin G Swelling     Tongue swelling         Medications - reviewed:   Current Outpatient Medications   Medication Sig    OLANZapine (ZyPREXA) 15 mg tablet     risperiDONE (RisperDAL) 4 mg tablet     sertraline (ZOLOFT) 100 mg tablet     traZODone (DESYREL) 100 mg tablet     lisinopriL (PRINIVIL, ZESTRIL) 40 mg tablet Take 1 Tab by mouth daily.  atorvastatin (LIPITOR) 40 mg tablet Take 1 Tab by mouth nightly.  amLODIPine (NORVASC) 5 mg tablet Take 1 Tab by mouth daily. Indications: high blood pressure    diclofenac EC (VOLTAREN) 75 mg EC tablet Take 1 Tab by mouth two (2) times a day.  albuterol (PROVENTIL HFA, VENTOLIN HFA, PROAIR HFA) 90 mcg/actuation inhaler Take 1 Puff by inhalation every four (4) hours as needed for Wheezing.  guaiFENesin (MUCINEX) 1,200 mg Ta12 ER tablet Take 1,200 mg by mouth two (2) times a day.  benzonatate (TESSALON) 200 mg capsule Take 1 Cap by mouth three (3) times daily as needed for Cough. No current facility-administered medications for this visit. Past Medical History - reviewed:  No past medical history on file. Immunizations - reviewed: There is no immunization history on file for this patient. ROS  Review of Systems : Review of Systems   Constitutional: Negative for chills and fever. HENT: Negative for congestion and sore throat. Respiratory: Negative for cough and shortness of breath.     Cardiovascular: Negative for chest pain and palpitations. Gastrointestinal: Negative for nausea and vomiting. Neurological: Negative for dizziness and headaches. Physical Exam  Visit Vitals  BP (!) 150/99   Pulse 71   Temp (!) 96.4 °F (35.8 °C) (Temporal)   Resp 18   Ht 5' 10\" (1.778 m)   Wt 191 lb 6.4 oz (86.8 kg)   SpO2 100%   BMI 27.46 kg/m²       General appearance - Alert, NAD. Head: Atraumatic. Normocephalic. No lymphadenopathy  Eyes: EOMI. Sclera white. Ears: Hearing grossly normal.    Nose: Nares patent, no polyps  Throat: pharynx clear, no exudates. Respiratory - LCTAB. No wheeze/rale/rhonchi  Heart - Normal rate, regular rhythm. No m/r/r  Abdomen - Soft, non tender. Non distended. Neurological - No focal deficits. Speech normal.   Musculoskeletal - Normal ROM, Gait normal.    Extremities - No LE edema. Distal pulses intact  Skin - normal coloration and normal turgor. No cyanosis, no rash. Assessment/Plan  1. Essential hypertension- Doubling lisinopril until out of med and then take new at 40mg. Follow up in 2-4 wks. Educated on risks and benefits of BP control  - lisinopriL (PRINIVIL, ZESTRIL) 40 mg tablet; Take 1 Tab by mouth daily. Dispense: 30 Tab; Refill: 5    2. Hyperlipidemia LDL goal <100- ASCVD risk 14.6%. Pt did not get Lipitor due to wrong pharmacy. [facility] to [facility]. - atorvastatin (LIPITOR) 40 mg tablet; Take 1 Tab by mouth nightly. Dispense: 30 Tab; Refill: 5        I have discussed the aforementioned diagnoses and plan with the patient in detail. I have provided information in person and/or in AVS. All questions answered prior to discharge.     Yobany Del Toro MD  Family Medicine Resident  PGY 2

## 2021-01-05 NOTE — PROGRESS NOTES
I reviewed with the resident the medical history and the resident's findings on the physical examination.  I discussed with the resident the patient's diagnosis and concur with the plan.

## 2021-09-13 ENCOUNTER — OFFICE VISIT (OUTPATIENT)
Dept: FAMILY MEDICINE CLINIC | Age: 56
End: 2021-09-13
Payer: MEDICAID

## 2021-09-13 VITALS
OXYGEN SATURATION: 98 % | HEIGHT: 70 IN | TEMPERATURE: 97.3 F | BODY MASS INDEX: 26.08 KG/M2 | WEIGHT: 182.2 LBS | SYSTOLIC BLOOD PRESSURE: 129 MMHG | DIASTOLIC BLOOD PRESSURE: 88 MMHG | RESPIRATION RATE: 12 BRPM | HEART RATE: 83 BPM

## 2021-09-13 DIAGNOSIS — R63.0 LOSS OF APPETITE: ICD-10-CM

## 2021-09-13 DIAGNOSIS — F07.81 POST CONCUSSIVE SYNDROME: Primary | ICD-10-CM

## 2021-09-13 DIAGNOSIS — Z09 HOSPITAL DISCHARGE FOLLOW-UP: ICD-10-CM

## 2021-09-13 DIAGNOSIS — H53.9 VISION CHANGES: ICD-10-CM

## 2021-09-13 DIAGNOSIS — I10 ESSENTIAL HYPERTENSION: ICD-10-CM

## 2021-09-13 DIAGNOSIS — L30.9 DERMATITIS: ICD-10-CM

## 2021-09-13 PROCEDURE — 1111F DSCHRG MED/CURRENT MED MERGE: CPT | Performed by: FAMILY MEDICINE

## 2021-09-13 PROCEDURE — 99214 OFFICE O/P EST MOD 30 MIN: CPT | Performed by: FAMILY MEDICINE

## 2021-09-13 RX ORDER — MECLIZINE HYDROCHLORIDE 25 MG/1
25 TABLET ORAL
Qty: 30 TABLET | Refills: 2 | Status: SHIPPED | OUTPATIENT
Start: 2021-09-13 | End: 2022-08-12

## 2021-09-13 RX ORDER — MEGESTROL ACETATE 20 MG/1
20 TABLET ORAL DAILY
Qty: 30 TABLET | Refills: 0 | Status: SHIPPED | OUTPATIENT
Start: 2021-09-13 | End: 2022-02-01

## 2021-09-13 RX ORDER — CLINDAMYCIN HYDROCHLORIDE 300 MG/1
CAPSULE ORAL
COMMUNITY
Start: 2021-08-09 | End: 2021-09-13

## 2021-09-13 RX ORDER — TRIAMCINOLONE ACETONIDE 1 MG/G
OINTMENT TOPICAL 2 TIMES DAILY
Qty: 30 G | Refills: 0 | Status: SHIPPED | OUTPATIENT
Start: 2021-09-13 | End: 2021-09-29
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
## 2021-09-16 ENCOUNTER — TELEPHONE (OUTPATIENT)
Dept: FAMILY MEDICINE CLINIC | Age: 56
End: 2021-09-16

## 2021-09-29 ENCOUNTER — OFFICE VISIT (OUTPATIENT)
Dept: FAMILY MEDICINE CLINIC | Age: 56
End: 2021-09-29
Payer: MEDICAID

## 2021-09-29 VITALS
RESPIRATION RATE: 18 BRPM | BODY MASS INDEX: 25.91 KG/M2 | HEART RATE: 89 BPM | HEIGHT: 70 IN | WEIGHT: 181 LBS | OXYGEN SATURATION: 99 % | DIASTOLIC BLOOD PRESSURE: 91 MMHG | TEMPERATURE: 98.5 F | SYSTOLIC BLOOD PRESSURE: 146 MMHG

## 2021-09-29 DIAGNOSIS — B35.4 TINEA CORPORIS: ICD-10-CM

## 2021-09-29 DIAGNOSIS — F33.41 RECURRENT MAJOR DEPRESSIVE DISORDER, IN PARTIAL REMISSION (HCC): ICD-10-CM

## 2021-09-29 DIAGNOSIS — F07.81 POST CONCUSSIVE SYNDROME: ICD-10-CM

## 2021-09-29 DIAGNOSIS — L30.9 DERMATITIS: Primary | ICD-10-CM

## 2021-09-29 DIAGNOSIS — I10 ESSENTIAL HYPERTENSION: ICD-10-CM

## 2021-09-29 PROCEDURE — 99214 OFFICE O/P EST MOD 30 MIN: CPT | Performed by: FAMILY MEDICINE

## 2021-09-29 RX ORDER — TRIAMCINOLONE ACETONIDE 1 MG/G
OINTMENT TOPICAL 2 TIMES DAILY
Qty: 30 G | Refills: 0 | Status: SHIPPED | OUTPATIENT
Start: 2021-09-29 | End: 2022-08-12

## 2021-09-29 RX ORDER — KETOCONAZOLE 20 MG/G
CREAM TOPICAL 2 TIMES DAILY
Qty: 30 G | Refills: 0 | Status: SHIPPED | OUTPATIENT
Start: 2021-09-29 | End: 2022-08-12

## 2021-09-29 NOTE — PROGRESS NOTES
1. Have you been to the ER, urgent care clinic since your last visit? Hospitalized since your last visit? No    2. Have you seen or consulted any other health care providers outside of the [address] [place] [address] since your last visit? Include any pap smears or colon screening. No    Reviewed record in preparation for visit and have necessary documentation  Goals that were addressed and/or need to be completed during or after this appointment include     Health Maintenance Due   Topic Date Due    DTaP/Tdap/Td series (1 - Tdap) Never done    Colorectal Cancer Screening Combo  Never done    Shingrix Vaccine Age 50> (1 of 2) Never done    Flu Vaccine (1) Never done       Patient is accompanied by self I have received verbal consent from Latasha Meier to discuss any/all medical information while they are present in the room.

## 2021-09-29 NOTE — PROGRESS NOTES
Progress Note    Patient: Hazel Villagomez MRN: [id number]  SSN: [id number]    YOB: 1965  Age: 48 y.o. Sex: male        Chief Complaint   Patient presents with    Rash     neck     he is a 48y.o. year old male who presents for follow up of dermatitis on neck and chest. He says there has been minimal improvement with topical steroid. Patient on psychotropic medications managed by [facility]. BP elevated. Weight stable. He says post concussion symptoms  resolved. BP Readings from Last 3 Encounters:   09/29/21 (!) 146/91   09/13/21 129/88   01/05/21 (!) 150/99     Wt Readings from Last 3 Encounters:   09/29/21 181 lb (82.1 kg)   09/13/21 182 lb 3.2 oz (82.6 kg)   01/05/21 191 lb 6.4 oz (86.8 kg)     Body mass index is 25.97 kg/m². Encounter Diagnoses   Name Primary?  Dermatitis Yes    Tinea corporis     Essential hypertension     Post concussive syndrome     Recurrent major depressive disorder, [facility] partial [facility] ([facility]        Patient Active Problem List   Diagnosis Code    Anxiety F41.9    Heart palpitations R00.2    Essential hypertension I10    Bilateral low back pain without sciatica M54.5     History reviewed. No pertinent surgical history.   Social History     Socioeconomic History    Marital status: SINGLE     Spouse name: Not on file    Number of children: Not on file    Years of education: Not on file    Highest education level: Not on file   Occupational History    Not on file   Tobacco Use    Smoking status: Never Smoker    Smokeless tobacco: Never Used   Substance and Sexual Activity    Alcohol use: No    Drug use: No    Sexual activity: Not on file   Other Topics Concern    Not on file   Social History Narrative    Not on file     Social Determinants of Health     Financial Resource Strain:     Difficulty of Paying Living Expenses:    Food Insecurity:     Worried About Running Out of Food in the Last Year:     [address] in the Last Year: Transportation Needs:     Lack of Transportation (Medical):  Lack of Transportation (Non-Medical):    Physical Activity:     Days of Exercise per Week:     Minutes of Exercise per Session:    Stress:     Feeling of Stress :    Social Connections:     Frequency of Communication with Friends and Family:     Frequency of Social Gatherings with Friends and Family:     Attends Pentecostal Services:     Active Member of Clubs or Organizations:     Attends Club or Organization Meetings:     Marital Status:    Intimate Partner Violence:     Fear of Current or Ex-Partner:     Emotionally Abused:     Physically Abused:     Sexually Abused:      Family History   Problem Relation Age of Onset    Heart Disease Mother      Current Outpatient Medications   Medication Sig    ketoconazole (NIZORAL) 2 % topical cream Apply  to affected area two (2) times a day.  triamcinolone acetonide (KENALOG) 0.1 % ointment Apply  to affected area two (2) times a day. use thin layer    lisinopriL (PRINIVIL, ZESTRIL) 40 mg tablet TAKE ONE TABLET BY MOUTH EVERY DAY    meclizine (ANTIVERT) 25 mg tablet Take 1 Tablet by mouth three (3) times daily as needed for Dizziness or Nausea.  megestroL (MEGACE) 20 mg tablet Take 1 Tablet by mouth daily. Indications: appetite    OLANZapine (ZyPREXA) 15 mg tablet     risperiDONE (RisperDAL) 4 mg tablet     sertraline (ZOLOFT) 100 mg tablet     traZODone (DESYREL) 100 mg tablet      No current facility-administered medications for this visit.      Allergies   Allergen Reactions    Penicillin G Swelling     Tongue swelling       Review of Systems:  Constitutional: Negative for fatigue, malaise  Derm: see HPI  Resp: Negative for cough, wheezing or SOB  CV: Negative for chest pain, dizziness or palpitations  GI: Negative for nausea or abdominal pain  MS: Negative for acute myalgias or arthralgias   Neuro: see HPI  Psych: see HPI    Vitals:    09/29/21 0810 09/29/21 0819   BP: (!) 151/92 (!) 146/91   Pulse: 89    Resp: 18    Temp: 98.5 °F (36.9 °C)    TempSrc: Oral    SpO2: 99%    Weight: 181 lb (82.1 kg)    Height: 5' 10\" (1.778 m)        Physical Examination:  General: Well developed, well nourished, in no acute distress  Skin: hypopigmented patches on neck and upper chest.   Head: Normocephalic, atraumatic  Eyes: Sclera clear, EOMI  Neck: Normal range of motion  Respiratory: Symmetrical, unlabored effort  Cardiovascular: Regular rate and rhythm  Extremities: Full range of motion, normal gait  Neurologic: No focal deficits  Psych: Active, alert and oriented. Affect appropriate       ICD-10-CM ICD-9-CM    1. Dermatitis  L30.9 692.9 triamcinolone acetonide (KENALOG) 0.1 % ointment   2. Tinea corporis  B35.4 110.5 ketoconazole (NIZORAL) 2 % topical cream   3. Essential hypertension  I10 401.9    4. Post concussive syndrome  F07.81 310.2    5. Recurrent major depressive disorder, in partial remission ([facility]  F33.41 296.35        Plan of care:  Diagnoses were discussed in detail with patient. Medication risks/benefits/side effects discussed with patient. All of the patient's questions were addressed and answered to apparent satisfaction. The patient understands and agrees with our plan of care. The patient knows to call back if they have questions about the plan of care or if symptoms change. The patient received an After-Visit Summary which contains VS, diagnoses, orders, allergy and medication lists. No future appointments. Follow-up and Dispositions    · Return if symptoms worsen or fail to improve.

## 2021-12-22 ENCOUNTER — NURSE TRIAGE (OUTPATIENT)
Dept: OTHER | Facility: CLINIC | Age: 56
End: 2021-12-22

## 2021-12-22 NOTE — TELEPHONE ENCOUNTER
Received call from [facility] at [facility], caller not on line. Complaint: Head pain     Market: Mehran Lopes Name: yavalu telephone number verified as - [facility] disconnected before number could be verified.      Unsuccessful attempt to re-connect with caller via phone, unable to leave message for callback

## 2021-12-22 NOTE — TELEPHONE ENCOUNTER
Reason for Disposition   Wrong number reached. Phone number verified.     Protocols used: NO CONTACT OR DUPLICATE CONTACT CALL-ADULT-

## 2022-02-01 ENCOUNTER — OFFICE VISIT (OUTPATIENT)
Dept: FAMILY MEDICINE CLINIC | Age: 57
End: 2022-02-01
Payer: MEDICAID

## 2022-02-01 VITALS
DIASTOLIC BLOOD PRESSURE: 83 MMHG | WEIGHT: 186 LBS | HEIGHT: 70 IN | SYSTOLIC BLOOD PRESSURE: 152 MMHG | RESPIRATION RATE: 18 BRPM | TEMPERATURE: 98.2 F | HEART RATE: 89 BPM | BODY MASS INDEX: 26.63 KG/M2 | OXYGEN SATURATION: 99 %

## 2022-02-01 DIAGNOSIS — I10 ESSENTIAL HYPERTENSION: ICD-10-CM

## 2022-02-01 DIAGNOSIS — Z79.899 LONG TERM CURRENT USE OF ANTIPSYCHOTIC MEDICATION: Primary | ICD-10-CM

## 2022-02-01 PROCEDURE — 99214 OFFICE O/P EST MOD 30 MIN: CPT | Performed by: STUDENT IN AN ORGANIZED HEALTH CARE EDUCATION/TRAINING PROGRAM

## 2022-02-01 RX ORDER — RISPERIDONE 2 MG/1
2 TABLET, FILM COATED ORAL 2 TIMES DAILY
Qty: 60 TABLET | Refills: 3 | Status: SHIPPED | OUTPATIENT
Start: 2022-02-01 | End: 2022-08-05

## 2022-02-01 RX ORDER — SERTRALINE HYDROCHLORIDE 50 MG/1
50 TABLET, FILM COATED ORAL DAILY
Qty: 30 TABLET | Refills: 3 | Status: SHIPPED | OUTPATIENT
Start: 2022-02-01

## 2022-02-01 RX ORDER — LISINOPRIL AND HYDROCHLOROTHIAZIDE 12.5; 2 MG/1; MG/1
1 TABLET ORAL DAILY
Qty: 30 TABLET | Refills: 0 | Status: SHIPPED | OUTPATIENT
Start: 2022-02-01 | End: 2022-04-07

## 2022-02-01 RX ORDER — OLANZAPINE 15 MG/1
15 TABLET ORAL
Qty: 30 TABLET | Refills: 3 | Status: SHIPPED | OUTPATIENT
Start: 2022-02-01 | End: 2022-09-26

## 2022-02-01 RX ORDER — SERTRALINE HYDROCHLORIDE 100 MG/1
100 TABLET, FILM COATED ORAL DAILY
Qty: 30 TABLET | Refills: 3 | Status: SHIPPED | OUTPATIENT
Start: 2022-02-01 | End: 2022-09-26

## 2022-02-01 RX ORDER — RISPERIDONE 4 MG/1
6 TABLET, FILM COATED ORAL 2 TIMES DAILY
Qty: 90 TABLET | Refills: 3 | Status: SHIPPED | OUTPATIENT
Start: 2022-02-01 | End: 2022-08-05

## 2022-02-01 NOTE — PROGRESS NOTES
1. Have you been to the ER, urgent care clinic since your last visit? Hospitalized since your last visit? No    2. Have you seen or consulted any other health care providers outside of the [address] [place] [address] since your last visit? Include any pap smears or colon screening. No    Reviewed record in preparation for visit and have necessary documentation  Goals that were addressed and/or need to be completed during or after this appointment include     Health Maintenance Due   Topic Date Due    DTaP/Tdap/Td series (1 - Tdap) Never done    Colorectal Cancer Screening Combo  Never done    Shingrix Vaccine Age 50> (1 of 2) Never done    Flu Vaccine (1) Never done    COVID-19 Vaccine (3 - Booster for Dueñas Peter series) 01/20/2022       Patient is accompanied by self I have received verbal consent from Ledy Rosales to discuss any/all medical information while they are present in the room.

## 2022-02-01 NOTE — PROGRESS NOTES
Nikolay Rock (: 1965) is a 55 y.o. male, established patient, here for evaluation of the following chief complaint(s):  Medication Evaluation       Assessment/Plan:  1. Long term current use of antipsychotic medication- Previously seen by [facility]. They are in the process of dismissing from their service as he has not been receiving services in addition to medical management and has been noncompliant with appts. They request medication management by PCP. I reviewed med review from [facility] on 2021 scanned in media. Meds reordered with a decrease in olanzapine from 20mg to 15mg for hypersomnia. Zoloft 150mg daily  Olanzapine 20mg at bedtime  Risperidone 6mg BID  -     METABOLIC PANEL, COMPREHENSIVE; Future  -     LIPID PANEL; Future  -     CBC WITH AUTOMATED DIFF; Future  2. Essential hypertension- Will trade lisinopril 40mg daily for combined therapy. RTC in 2wks. Will double tablets if not controlled next visit. -     lisinopril-hydroCHLOROthiazide (PRINZIDE, ZESTORETIC) 20-12.5 mg per tablet; Take 1 Tablet by mouth daily. , Normal, Disp-30 Tablet, R-0    Return in about 2 weeks (around 2/15/2022). Subjective/Objective:  HPI  Medication management- Pt was previously being seen at [facility] for med management after an inpatient psych stay. He has had difficulty getting to those appts and is not receiving any other therapies or services from [facility]. Physical Exam  Blood pressure (!) 152/83, pulse 89, temperature 98.2 °F (36.8 °C), temperature source Oral, resp. rate 18, height 5' 10\" (1.778 m), weight 186 lb (84.4 kg), SpO2 99 %. Body mass index is 26.69 kg/m². General appearance - Alert, NAD. Head - Atraumatic. Normocephalic. No lymphadenopathy  Eyes - EOMI. Sclera white. Ears - Hearing grossly normal.    Nose - Nares patent, no polyps  Throat - pharynx clear, no exudates. Respiratory - LCTAB. No wheeze/rale/rhonchi  Heart - Normal rate, regular rhythm.  No m/r/r  Abdomen - Soft, non tender. Non distended. Neurological - No focal deficits. Speech normal.   Musculoskeletal - Normal ROM, Gait normal.    Extremities - No LE edema. Skin - normal coloration and normal turgor. No cyanosis, no rash. Medical History- Reviewed Social History- Reviewed Surgical History- Reviewed   Cooper Mir has no past medical history of Abuse. Cooper Mir reports that he has never smoked. He has never used smokeless tobacco. He reports that he does not drink alcohol and does not use drugs. Cooper Mir has no past surgical history on file. Problem List- Reviewed   Cooper Mir has Anxiety, Heart palpitations, Essential hypertension, and Bilateral low back pain without sciatica on their problem list.     Current Outpatient Medications   Medication Instructions    ketoconazole (NIZORAL) 2 % topical cream Topical, 2 TIMES DAILY    lisinopril-hydroCHLOROthiazide (PRINZIDE, ZESTORETIC) 20-12.5 mg per tablet 1 Tablet, Oral, DAILY    meclizine (ANTIVERT) 25 mg, Oral, 3 TIMES DAILY AS NEEDED    OLANZapine (ZYPREXA) 15 mg, Oral, EVERY BEDTIME    risperiDONE (RISPERDAL) 6 mg, Oral, 2 TIMES DAILY    risperiDONE (RISPERDAL) 2 mg, Oral, 2 TIMES DAILY, Take in addition to 4mg tablet to equal a total of 6mg twice a day    sertraline (ZOLOFT) 100 mg, Oral, DAILY    sertraline (ZOLOFT) 50 mg, Oral, DAILY, Take in addition to 100mg tablet to equal a total of 150mg daily    triamcinolone acetonide (KENALOG) 0.1 % ointment Topical, 2 TIMES DAILY, use thin layer           An electronic signature was used to authenticate this note.   -- Marco Hopkins MD

## 2022-02-02 LAB
ALBUMIN SERPL-MCNC: 4.9 G/DL (ref 3.8–4.9)
ALBUMIN/GLOB SERPL: 2 {RATIO} (ref 1.2–2.2)
ALP SERPL-CCNC: 102 IU/L (ref 44–121)
ALT SERPL-CCNC: 76 IU/L (ref 0–44)
AST SERPL-CCNC: 96 IU/L (ref 0–40)
BASOPHILS # BLD AUTO: 0 X10E3/UL (ref 0–0.2)
BASOPHILS NFR BLD AUTO: 1 %
BILIRUB SERPL-MCNC: 0.5 MG/DL (ref 0–1.2)
BUN SERPL-MCNC: 7 MG/DL (ref 6–24)
BUN/CREAT SERPL: 6 (ref 9–20)
CALCIUM SERPL-MCNC: 9.6 MG/DL (ref 8.7–10.2)
CHLORIDE SERPL-SCNC: 100 MMOL/L (ref 96–106)
CHOLEST SERPL-MCNC: 245 MG/DL (ref 100–199)
CO2 SERPL-SCNC: 23 MMOL/L (ref 20–29)
CREAT SERPL-MCNC: 1.09 MG/DL (ref 0.76–1.27)
EOSINOPHIL # BLD AUTO: 0.2 X10E3/UL (ref 0–0.4)
EOSINOPHIL NFR BLD AUTO: 6 %
ERYTHROCYTE [DISTWIDTH] IN BLOOD BY AUTOMATED COUNT: 12.3 % (ref 11.6–15.4)
GLOBULIN SER CALC-MCNC: 2.5 G/DL (ref 1.5–4.5)
GLUCOSE SERPL-MCNC: 88 MG/DL (ref 65–99)
HCT VFR BLD AUTO: 45.3 % (ref 37.5–51)
HDLC SERPL-MCNC: 78 MG/DL
HGB BLD-MCNC: 15.5 G/DL (ref 13–17.7)
IMM GRANULOCYTES # BLD AUTO: 0 X10E3/UL (ref 0–0.1)
IMM GRANULOCYTES NFR BLD AUTO: 0 %
LDLC SERPL CALC-MCNC: 147 MG/DL (ref 0–99)
LYMPHOCYTES # BLD AUTO: 1.1 X10E3/UL (ref 0.7–3.1)
LYMPHOCYTES NFR BLD AUTO: 33 %
MCH RBC QN AUTO: 30.9 PG (ref 26.6–33)
MCHC RBC AUTO-ENTMCNC: 34.2 G/DL (ref 31.5–35.7)
MCV RBC AUTO: 90 FL (ref 79–97)
MONOCYTES # BLD AUTO: 0.5 X10E3/UL (ref 0.1–0.9)
MONOCYTES NFR BLD AUTO: 14 %
NEUTROPHILS # BLD AUTO: 1.6 X10E3/UL (ref 1.4–7)
NEUTROPHILS NFR BLD AUTO: 46 %
PLATELET # BLD AUTO: 263 X10E3/UL (ref 150–450)
POTASSIUM SERPL-SCNC: 4.7 MMOL/L (ref 3.5–5.2)
PROT SERPL-MCNC: 7.4 G/DL (ref 6–8.5)
RBC # BLD AUTO: 5.01 X10E6/UL (ref 4.14–5.8)
SODIUM SERPL-SCNC: 139 MMOL/L (ref 134–144)
TRIGL SERPL-MCNC: 118 MG/DL (ref 0–149)
VLDLC SERPL CALC-MCNC: 20 MG/DL (ref 5–40)
WBC # BLD AUTO: 3.5 X10E3/UL (ref 3.4–10.8)

## 2022-03-03 DIAGNOSIS — E78.00 HYPERCHOLESTEREMIA: Primary | ICD-10-CM

## 2022-03-03 RX ORDER — ATORVASTATIN CALCIUM 40 MG/1
40 TABLET, FILM COATED ORAL
Qty: 30 TABLET | Refills: 2 | Status: SHIPPED | OUTPATIENT
Start: 2022-03-03

## 2022-04-03 DIAGNOSIS — I10 ESSENTIAL HYPERTENSION: ICD-10-CM

## 2022-04-07 RX ORDER — LISINOPRIL AND HYDROCHLOROTHIAZIDE 12.5; 2 MG/1; MG/1
1 TABLET ORAL DAILY
Qty: 30 TABLET | Refills: 0 | Status: SHIPPED | OUTPATIENT
Start: 2022-04-07 | End: 2022-05-24

## 2022-05-24 DIAGNOSIS — I10 ESSENTIAL HYPERTENSION: ICD-10-CM

## 2022-05-24 RX ORDER — LISINOPRIL AND HYDROCHLOROTHIAZIDE 12.5; 2 MG/1; MG/1
TABLET ORAL
Qty: 30 TABLET | Refills: 0 | Status: SHIPPED | OUTPATIENT
Start: 2022-05-24 | End: 2022-07-02

## 2022-06-30 ENCOUNTER — TELEPHONE (OUTPATIENT)
Dept: FAMILY MEDICINE CLINIC | Age: 57
End: 2022-06-30

## 2022-06-30 DIAGNOSIS — I10 ESSENTIAL HYPERTENSION: ICD-10-CM

## 2022-06-30 NOTE — TELEPHONE ENCOUNTER
Attempted to call. No answer. Message left. Patient is due for follow up appointment. Please schedule with resident if patient returns call.

## 2022-07-02 RX ORDER — LISINOPRIL AND HYDROCHLOROTHIAZIDE 12.5; 2 MG/1; MG/1
TABLET ORAL
Qty: 30 TABLET | Refills: 0 | Status: SHIPPED | OUTPATIENT
Start: 2022-07-02 | End: 2022-08-04

## 2022-08-04 DIAGNOSIS — I10 ESSENTIAL HYPERTENSION: ICD-10-CM

## 2022-08-04 DIAGNOSIS — Z79.899 LONG TERM CURRENT USE OF ANTIPSYCHOTIC MEDICATION: ICD-10-CM

## 2022-08-04 RX ORDER — LISINOPRIL AND HYDROCHLOROTHIAZIDE 12.5; 2 MG/1; MG/1
TABLET ORAL
Qty: 30 TABLET | Refills: 0 | Status: SHIPPED | OUTPATIENT
Start: 2022-08-04 | End: 2022-08-12 | Stop reason: SDUPTHER

## 2022-08-05 ENCOUNTER — TELEPHONE (OUTPATIENT)
Dept: FAMILY MEDICINE CLINIC | Age: 57
End: 2022-08-05

## 2022-08-05 RX ORDER — RISPERIDONE 2 MG/1
TABLET, FILM COATED ORAL
Qty: 60 TABLET | Refills: 0 | Status: SHIPPED | OUTPATIENT
Start: 2022-08-05

## 2022-08-05 RX ORDER — RISPERIDONE 4 MG/1
TABLET, FILM COATED ORAL
Qty: 60 TABLET | Refills: 0 | Status: SHIPPED | OUTPATIENT
Start: 2022-08-05

## 2022-08-05 NOTE — TELEPHONE ENCOUNTER
Attempted to call. No answer. Message left. Dr. Nell Mo has refilled  medications but patient needs to scheduled an follow up appt. Please schedule if patient returns call.

## 2022-08-08 ENCOUNTER — TELEPHONE (OUTPATIENT)
Dept: FAMILY MEDICINE CLINIC | Age: 57
End: 2022-08-08

## 2022-08-12 ENCOUNTER — OFFICE VISIT (OUTPATIENT)
Dept: FAMILY MEDICINE CLINIC | Age: 57
End: 2022-08-12
Payer: MEDICAID

## 2022-08-12 VITALS
RESPIRATION RATE: 16 BRPM | BODY MASS INDEX: 25.77 KG/M2 | HEIGHT: 70 IN | DIASTOLIC BLOOD PRESSURE: 77 MMHG | TEMPERATURE: 98.1 F | SYSTOLIC BLOOD PRESSURE: 126 MMHG | OXYGEN SATURATION: 98 % | WEIGHT: 180 LBS | HEART RATE: 85 BPM

## 2022-08-12 DIAGNOSIS — I10 ESSENTIAL HYPERTENSION: Primary | ICD-10-CM

## 2022-08-12 DIAGNOSIS — F31.70 BIPOLAR DISORDER IN PARTIAL REMISSION, MOST RECENT EPISODE UNSPECIFIED TYPE (HCC): ICD-10-CM

## 2022-08-12 DIAGNOSIS — Z79.899 LONG TERM CURRENT USE OF ANTIPSYCHOTIC MEDICATION: ICD-10-CM

## 2022-08-12 DIAGNOSIS — Z23 ENCOUNTER FOR VACCINATION: ICD-10-CM

## 2022-08-12 DIAGNOSIS — F20.9 SCHIZOPHRENIA, UNSPECIFIED TYPE (HCC): ICD-10-CM

## 2022-08-12 DIAGNOSIS — E78.00 HYPERCHOLESTEREMIA: ICD-10-CM

## 2022-08-12 DIAGNOSIS — Z00.00 HEALTH CARE MAINTENANCE: ICD-10-CM

## 2022-08-12 PROBLEM — F31.9 BIPOLAR DISORDER (HCC): Status: ACTIVE | Noted: 2022-08-12

## 2022-08-12 PROCEDURE — 99396 PREV VISIT EST AGE 40-64: CPT | Performed by: STUDENT IN AN ORGANIZED HEALTH CARE EDUCATION/TRAINING PROGRAM

## 2022-08-12 PROCEDURE — 99214 OFFICE O/P EST MOD 30 MIN: CPT | Performed by: STUDENT IN AN ORGANIZED HEALTH CARE EDUCATION/TRAINING PROGRAM

## 2022-08-12 RX ORDER — LISINOPRIL AND HYDROCHLOROTHIAZIDE 12.5; 2 MG/1; MG/1
1 TABLET ORAL DAILY
Qty: 90 TABLET | Refills: 0 | Status: SHIPPED | OUTPATIENT
Start: 2022-08-12

## 2022-08-12 RX ORDER — ZOSTER VACCINE RECOMBINANT, ADJUVANTED 50 MCG/0.5
0.5 KIT INTRAMUSCULAR ONCE
Qty: 0.5 ML | Refills: 0 | Status: SHIPPED | OUTPATIENT
Start: 2022-08-12 | End: 2022-08-12

## 2022-08-12 NOTE — PROGRESS NOTES
1. \"Have you been to the ER, urgent care clinic since your last visit? Hospitalized since your last visit? \" No    2. \"Have you seen or consulted any other health care providers outside of the [address] [place] [address] since your last visit? \" No     3. For patients aged 39-70: Has the patient had a colonoscopy / FIT/ Cologuard? No      If the patient is female:    4. For patients aged 41-77: Has the patient had a mammogram within the past 2 years? NA - based on age or sex      11. For patients aged 21-65: Has the patient had a pap smear?  NA - based on age or sex    Health Maintenance Due   Topic Date Due    DTaP/Tdap/Td series (1 - Tdap) Never done    Colorectal Cancer Screening Combo  Never done    Shingrix Vaccine Age 50> (1 of 2) Never done    COVID-19 Vaccine (3 - Booster for Dueñas Peter series) 01/20/2022

## 2022-08-12 NOTE — PATIENT INSTRUCTIONS
[address] [place] [address] 
 
 
 [address] [address] [address] [address] 
[phone] Patient: Sosa Kumar 
MRN: [id number] Visit Information Date & Time Provider Department Dept. Phone Encounter #  
 2018 10:30 AM Claris Apgar, NP [facility] Carla [address] [place] [address] [phone] [id number] Follow-up Instructions Return in about 4 weeks (around 5/3/2018) for HTN/HLD. Upcoming Health Maintenance Date Due COLONOSCOPY 1970 ZOSTER VACCINE AGE 60> 2012 GLAUCOMA SCREENING Q2Y 2017 Influenza Age 5 to Adult 10/4/2018* Pneumococcal 65+ Low/Medium Risk (2 of 2 - PPSV23) 2018 MEDICARE YEARLY EXAM 2018 BREAST CANCER SCRN MAMMOGRAM 2019 DTaP/Tdap/Td series (2 - Td) 12/3/2025 *Topic was postponed. The date shown is not the original due date. Allergies as of 2018  Review Complete On: 2018 By: Claris Apgar, NP Severity Noted Reaction Type Reactions Iodine High 2013    Anaphylaxis Bee Venom Protein (Honey Bee)  2017    Hives Current Immunizations  Reviewed on 2017 Name Date Pneumococcal Conjugate (PCV-13) 2017 Tdap 12/3/2015 Not reviewed this visit You Were Diagnosed With   
  
 Codes Comments Abdominal pain, unspecified abdominal location    -  Primary ICD-10-CM: R10.9 ICD-9-CM: 789.00 Labial abscess     ICD-10-CM: N76.4 ICD-9-CM: 616.4 Acute cystitis with hematuria     ICD-10-CM: N30.01 
ICD-9-CM: 595.0 BV (bacterial vaginosis)     ICD-10-CM: N76.0, B96.89 
ICD-9-CM: 616.10, 041.9 Vitals BP Pulse Temp Resp Height(growth percentile) Weight(growth percentile) 126/74 (BP 1 Location: Right arm, BP Patient Position: Sitting) 86 98.4 °F (36.9 °C) (Oral) 18 5' 8\" (1.727 m) 205 lb 6.4 oz (93.2 kg) SpO2 BMI OB Status Smoking Status 100% 31.23 kg/m2 Hysterectomy Former Smoker Vitals History Please call [facility] to make an appointment to be seen. Please make an appointment to be seen by a Psychiatrist at least once per year. Please measure your blood pressure every day and record your numbers in a log. Kem Garcia with [facility]  [address] [place] [address], [address] [address]., [place] [address] [place] [address]  [phone]    Log blood pressures at home while sitting, relaxed 3-5 times weekly and bring to next visit. Goal BP of 130/80 on average or lower. Call office as soon as possible if BP's over 140/90 or below 110/50 on multiple occasions and/or with symptoms of dizziness, chest pain, shortness of breath, headache or ankle swelling.     Blood Pressure Record     Patient Name:  ______________________ :  ______________________    Date/Time BP Reading Pulse BMI and BSA Data Body Mass Index Body Surface Area  
 31.23 kg/m 2 2.11 m 2 Preferred Pharmacy Pharmacy Name Phone [facility] [address] [facility] [phone] Your Updated Medication List  
  
   
This list is accurate as of 4/5/18 11:35 AM.  Always use your most recent med list.  
  
  
  
  
 amoxicillin 875 mg tablet Commonly known as:  AMOXIL Take 1 Tab by mouth two (2) times a day for 10 days. aspirin delayed-release 81 mg tablet Take  by mouth daily. atorvastatin 20 mg tablet Commonly known as:  LIPITOR Take 1 Tab by mouth daily. FLUoxetine 20 mg tablet Commonly known as:  PROzac Take 2 Tabs by mouth daily. lisinopril 5 mg tablet Commonly known as:  Helon Estrin Take 1 Tab by mouth daily. LORazepam 1 mg tablet Commonly known as:  ATIVAN Take 1 Tab by mouth every eight (8) hours as needed for Anxiety. Max Daily Amount: 3 mg.  
  
 metFORMIN 500 mg tablet Commonly known as:  GLUCOPHAGE Take 1 Tab by mouth two (2) times daily (with meals). metroNIDAZOLE 500 mg tablet Commonly known as:  FLAGYL Take 1 Tab by mouth two (2) times a day for 7 days. pantoprazole 40 mg tablet Commonly known as:  PROTONIX Take 1 Tab by mouth daily. Prescriptions Sent to Pharmacy Refills  
 metroNIDAZOLE (FLAGYL) 500 mg tablet 0 Sig: Take 1 Tab by mouth two (2) times a day for 7 days. Class: Normal  
 Pharmacy: [address] Ph #: [phone] Route: Oral  
 amoxicillin (AMOXIL) 875 mg tablet 0 Sig: Take 1 Tab by mouth two (2) times a day for 10 days. Class: Normal  
 Pharmacy: [address] Ph #: [phone] Route: Oral  
  
We Performed the Following AMB POC URINALYSIS DIP STICK AUTO W/O MICRO [72365 CPT(R)] Follow-up Instructions Return in about 4 weeks (around 5/3/2018) for HTN/HLD. Patient Instructions Bacterial Vaginosis: Care Instructions Your Care Instructions Bacterial vaginosis is a type of vaginal infection. It is caused by excess growth of certain bacteria that are normally found in the vagina. Symptoms can include itching, swelling, pain when you urinate or have sex, and a gray or yellow discharge with a \"fishy\" odor. It is not considered an infection that is spread through sexual contact. Although symptoms can be annoying and uncomfortable, bacterial vaginosis does not usually cause other health problems. However, if you have it while you are pregnant, it can cause complications. While the infection may go away on its own, most doctors use antibiotics to treat it. You may have been prescribed pills or vaginal cream. With treatment, bacterial vaginosis usually clears up in 5 to 7 days. Follow-up care is a key part of your treatment and safety. Be sure to make and go to all appointments, and call your doctor if you are having problems. It's also a good idea to know your test results and keep a list of the medicines you take. How can you care for yourself at home? · Take your antibiotics as directed. Do not stop taking them just because you feel better. You need to take the full course of antibiotics. · Do not eat or drink anything that contains alcohol if you are taking metronidazole (Flagyl). · Keep using your medicine if you start your period. Use pads instead of tampons while using a vaginal cream or suppository. Tampons can absorb the medicine. · Wear loose cotton clothing. Do not wear nylon and other materials that hold body heat and moisture close to the skin. · Do not scratch. Relieve itching with a cold pack or a cool bath. · Do not wash your vaginal area more than once a day. Use plain water or a mild, unscented soap. Do not douche. When should you call for help? Watch closely for changes in your health, and be sure to contact your doctor if: 
? · You have unexpected vaginal bleeding. ? · You have a fever. ? · You have new or increased pain in your vagina or pelvis. ? · You are not getting better after 1 week. ? · Your symptoms return after you finish the course of your medicine. Where can you learn more? Go to http://bruno-alicia.info/. Xiomara Ferris in the search box to learn more about \"Bacterial Vaginosis: Care Instructions. \" Current as of: October 13, 2016 Content Version: 11.4 © 8391-7403 [employer]. Care instructions adapted under license by [employer] (which disclaims liability or warranty for this information). If you have questions about a medical condition or this instruction, always ask your healthcare professional. [address] any warranty or liability for your use of this information. Skin Abscess: Care Instructions Your Care Instructions A skin abscess is a bacterial infection that forms a pocket of pus. A boil is a kind of skin abscess. The doctor may have cut an opening in the abscess so that the pus can drain out. You may have gauze in the cut so that the abscess will stay open and keep draining. You may need antibiotics. You will need to follow up with your doctor to make sure the infection has gone away. The doctor has checked you carefully, but problems can develop later. If you notice any problems or new symptoms, get medical treatment right away. Follow-up care is a key part of your treatment and safety. Be sure to make and go to all appointments, and call your doctor if you are having problems. It's also a good idea to know your test results and keep a list of the medicines you take. How can you care for yourself at home?  
· Apply warm and dry compresses, a heating pad set on low, or a hot water bottle 3 or 4 times a day for pain. Keep a cloth between the heat source and your skin. · If your doctor prescribed antibiotics, take them as directed. Do not stop taking them just because you feel better. You need to take the full course of antibiotics. · Take pain medicines exactly as directed. ¨ If the doctor gave you a prescription medicine for pain, take it as prescribed. ¨ If you are not taking a prescription pain medicine, ask your doctor if you can take an over-the-counter medicine. · Keep your bandage clean and dry. Change the bandage whenever it gets wet or dirty, or at least one time a day. · If the abscess was packed with gauze: 
¨ Keep follow-up appointments to have the gauze changed or removed. If the doctor instructed you to remove the gauze, gently pull out all of the gauze when your doctor tells you to. ¨ After the gauze is removed, soak the area in warm water for 15 to 20 minutes 2 times a day, until the wound closes. When should you call for help? Call your doctor now or seek immediate medical care if: 
? · You have signs of worsening infection, such as: 
¨ Increased pain, swelling, warmth, or redness. ¨ Red streaks leading from the infected skin. ¨ Pus draining from the wound. ¨ A fever. ? Watch closely for changes in your health, and be sure to contact your doctor if: 
? · You do not get better as expected. Where can you learn more? Go to http://bruno-alicia.info/. Enter O482 in the search box to learn more about \"Skin Abscess: Care Instructions. \" Current as of: October 13, 2016 Content Version: 11.4 © 8752-0723 [employer]. Care instructions adapted under license by [employer] (which disclaims liability or warranty for this information).  If you have questions about a medical condition or this instruction, always ask your healthcare professional. [address] any warranty or liability for your use of this information. Introducing \[facility]\"" & HEALTH SERVICES! [employer] introduces [employer] patient portal. Now you can access parts of your medical record, email your doctor's office, and request medication refills online. 1. In your internet browser, go to [email or web address] 2. Click on the First Time User? Click Here link in the Sign In box. You will see the New Member Sign Up page. 3. Enter your [employer] Access Code exactly as it appears below. You will not need to use this code after youve completed the sign-up process. If you do not sign up before the expiration date, you must request a new code. · [employer] Access Code: [email or web address] Expires: 7/4/2018 11:35 AM 
 
4. Enter the last four digits of your Social Security Number (xxxx) and Date of Birth (mm/dd/yyyy) as indicated and click Submit. You will be taken to the next sign-up page. 5. Create a Metro[employer] ID. This will be your Metro[employer] login ID and cannot be changed, so think of one that is secure and easy to remember. 6. Create a [employer] password. You can change your password at any time. 7. Enter your Password Reset Question and Answer. This can be used at a later time if you forget your password. 8. Enter your e-mail address. You will receive e-mail notification when new information is available in [facility]. 9. Click Sign Up. You can now view and download portions of your medical record. 10. Click the Download Summary menu link to download a portable copy of your medical information. If you have questions, please visit the Frequently Asked Questions section of the Metro[employer] website. Remember, [employer] is NOT to be used for urgent needs. For medical emergencies, dial 911. Now available from your iPhone and Android! Please provide this summary of care documentation to your next provider. Your primary care clinician is listed as [place].  If you have any questions after today's visit, please call [phone].
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
## 2022-08-15 LAB — TSH SERPL DL<=0.005 MIU/L-ACNC: 1.73 UIU/ML (ref 0.45–4.5)

## 2022-09-23 DIAGNOSIS — Z79.899 LONG TERM CURRENT USE OF ANTIPSYCHOTIC MEDICATION: ICD-10-CM

## 2022-09-26 RX ORDER — SERTRALINE HYDROCHLORIDE 100 MG/1
TABLET, FILM COATED ORAL
Qty: 30 TABLET | Refills: 0 | Status: SHIPPED | OUTPATIENT
Start: 2022-09-26

## 2022-09-26 RX ORDER — OLANZAPINE 15 MG/1
TABLET ORAL
Qty: 30 TABLET | Refills: 0 | Status: SHIPPED | OUTPATIENT
Start: 2022-09-26

## 2022-10-18 ENCOUNTER — TELEPHONE (OUTPATIENT)
Dept: FAMILY MEDICINE CLINIC | Age: 57
End: 2022-10-18

## 2022-10-18 NOTE — TELEPHONE ENCOUNTER
Called patient to notify that insurance denied Cologuard. No answer. Left voicemail with instructions to call office back. Plan to recommend FIT test versus colonoscopy.

## 2023-01-06 ENCOUNTER — TELEPHONE (OUTPATIENT)
Dept: FAMILY MEDICINE CLINIC | Age: 58
End: 2023-01-06

## 2023-01-06 ENCOUNTER — OFFICE VISIT (OUTPATIENT)
Dept: FAMILY MEDICINE CLINIC | Age: 58
End: 2023-01-06
Payer: MEDICARE

## 2023-01-06 VITALS
BODY MASS INDEX: 26.05 KG/M2 | DIASTOLIC BLOOD PRESSURE: 75 MMHG | RESPIRATION RATE: 18 BRPM | SYSTOLIC BLOOD PRESSURE: 129 MMHG | HEART RATE: 85 BPM | WEIGHT: 182 LBS | HEIGHT: 70 IN | TEMPERATURE: 98.4 F | OXYGEN SATURATION: 99 %

## 2023-01-06 DIAGNOSIS — E78.00 HYPERCHOLESTEREMIA: ICD-10-CM

## 2023-01-06 DIAGNOSIS — Z79.899 LONG TERM CURRENT USE OF ANTIPSYCHOTIC MEDICATION: ICD-10-CM

## 2023-01-06 DIAGNOSIS — F31.70 BIPOLAR DISORDER IN PARTIAL REMISSION, MOST RECENT EPISODE UNSPECIFIED TYPE (HCC): ICD-10-CM

## 2023-01-06 DIAGNOSIS — F20.9 SCHIZOPHRENIA, UNSPECIFIED TYPE (HCC): ICD-10-CM

## 2023-01-06 DIAGNOSIS — I10 ESSENTIAL HYPERTENSION: Primary | ICD-10-CM

## 2023-01-06 DIAGNOSIS — M79.675 TOE PAIN, LEFT: ICD-10-CM

## 2023-01-06 RX ORDER — AMLODIPINE BESYLATE 5 MG/1
5 TABLET ORAL DAILY
Qty: 90 TABLET | Refills: 1 | Status: SHIPPED | OUTPATIENT
Start: 2023-01-06

## 2023-01-06 RX ORDER — ATORVASTATIN CALCIUM 40 MG/1
40 TABLET, FILM COATED ORAL
Qty: 30 TABLET | Refills: 2 | Status: SHIPPED | OUTPATIENT
Start: 2023-01-06

## 2023-01-06 RX ORDER — SERTRALINE HYDROCHLORIDE 50 MG/1
50 TABLET, FILM COATED ORAL DAILY
Qty: 30 TABLET | Refills: 3 | Status: CANCELLED | OUTPATIENT
Start: 2023-01-06

## 2023-01-06 RX ORDER — SERTRALINE HYDROCHLORIDE 100 MG/1
100 TABLET, FILM COATED ORAL DAILY
Qty: 30 TABLET | Refills: 0 | Status: CANCELLED | OUTPATIENT
Start: 2023-01-06

## 2023-01-06 RX ORDER — OLANZAPINE 15 MG/1
15 TABLET ORAL
Qty: 30 TABLET | Refills: 0 | Status: CANCELLED | OUTPATIENT
Start: 2023-01-06

## 2023-01-06 RX ORDER — RISPERIDONE 2 MG/1
TABLET, FILM COATED ORAL
Qty: 60 TABLET | Refills: 0 | Status: CANCELLED | OUTPATIENT
Start: 2023-01-06

## 2023-01-06 RX ORDER — RISPERIDONE 4 MG/1
4 TABLET, FILM COATED ORAL 2 TIMES DAILY
Qty: 60 TABLET | Refills: 0 | Status: CANCELLED | OUTPATIENT
Start: 2023-01-06

## 2023-01-06 RX ORDER — COLCHICINE 0.6 MG/1
0.6 CAPSULE ORAL DAILY
Qty: 30 CAPSULE | Refills: 0 | Status: SHIPPED | OUTPATIENT
Start: 2023-01-06

## 2023-01-06 RX ORDER — COLCHICINE 0.6 MG/1
0.6 TABLET ORAL DAILY
Qty: 30 TABLET | Refills: 0 | Status: SHIPPED | OUTPATIENT
Start: 2023-01-06 | End: 2023-01-06

## 2023-01-06 RX ORDER — LISINOPRIL AND HYDROCHLOROTHIAZIDE 12.5; 2 MG/1; MG/1
1 TABLET ORAL DAILY
Qty: 90 TABLET | Refills: 0 | Status: CANCELLED | OUTPATIENT
Start: 2023-01-06

## 2023-01-06 NOTE — PROGRESS NOTES
Note written with assistance of voice recognition software. Chief Complaint   Patient presents with    Follow-up     Blood pressure    Toe Pain     Left great toe, greater than 1 week duration. History of Present Illness:  Bryant Aguirre is a 65 y.o. male w/ PMHx of HTN, HLD, bipolar disorder, schizophrenia who presents to clinic for follow-up of HTN. HTN: Pt takes lisinopril-HCTZ 20-12.5 mg daily.  - Labs including CMP ordered at last visit. L great toe pain:  - Started one week ago along with redness and swelling.  - Drinks beer occasionally and eats pork. - Has been using BC powder and ibuprofen. - Told had gout in the past.    HLD: Pt takes Lipitor 40 mg daily. Lab Results   Component Value Date/Time    LDL, calculated 147 (H) 02/01/2022 12:00 AM    LDL, calculated 147 (H) 09/16/2015 10:30 AM     Bipolar disorder / schizophrenia: Not currently following with psychiatry. - Pt reports that he needs a refill on Risperdal, Zyprexa, and sertraline.  - He states that he does not follow w/ Psychiatry. Colorectal cancer screening: Request for Cologuard was denied by insurance. Provided with prescription for Shingles vaccine at visit on 8/12/22. History reviewed. No pertinent past medical history. Current Outpatient Medications   Medication Sig Dispense Refill    atorvastatin (LIPITOR) 40 mg tablet Take 1 Tablet by mouth nightly. 30 Tablet 2    colchicine 0.6 mg tablet Take 1 Tablet by mouth daily for 30 days. 30 Tablet 0    amLODIPine (NORVASC) 5 mg tablet Take 1 Tablet by mouth daily. 90 Tablet 1    sertraline (ZOLOFT) 100 mg tablet TAKE ONE TABLET BY MOUTH EVERY DAY 30 Tablet 0    OLANZapine (ZyPREXA) 15 mg tablet TAKE ONE TABLET BY MOUTH NIGHTLY 30 Tablet 0    lisinopril-hydroCHLOROthiazide (PRINZIDE, ZESTORETIC) 20-12.5 mg per tablet Take 1 Tablet by mouth in the morning.  90 Tablet 0    risperiDONE (RisperDAL) 2 mg tablet TAKE ONE TABLET BY MOUTH TWICE DAILY ALONG WITH 4 MG FOR A TOTAL OF 6 MG TWICE DAILY 60 Tablet 0    risperiDONE (RisperDAL) 4 mg tablet TAKE 1 TABLET BY MOUTH TWICE DAILY 60 Tablet 0    sertraline (ZOLOFT) 50 mg tablet Take 1 Tablet by mouth daily. Take in addition to 100mg tablet to equal a total of 150mg daily 30 Tablet 3       Allergies   Allergen Reactions    Penicillin G Swelling     Tongue swelling       Social History     Tobacco Use    Smoking status: Never    Smokeless tobacco: Never   Substance Use Topics    Alcohol use: No    Drug use: No       Family History   Problem Relation Age of Onset    Heart Disease Mother        Physical Exam:     Visit Vitals  /75 (BP 1 Location: Left upper arm, BP Patient Position: Sitting, BP Cuff Size: Large adult)   Pulse 85   Temp 98.4 °F (36.9 °C) (Oral)   Resp 18   Ht 5' 10\" (1.778 m)   Wt 182 lb (82.6 kg)   SpO2 99%   BMI 26.11 kg/m²       Physical Examination:  General: NAD  CV: Heart: Regular rate and rhythm. Normal S1 and S2. No murmurs. Resp: Breathing comfortably on room air. MSK: There is mild swelling to the left first metatarsophalangeal joint. There is no overlying erythema or warmth. Neuro: Awake, alert, and appropriately conversant. Assessment/Plan:    Diagnoses and all orders for this visit:    1. Essential hypertension: Chronic. At goal.  On lisinopril-HCTZ 20-12.5 mg daily, but has been out of medication for an uncertain period of time. Recommended obtaining serum electrolytes and kidney function to monitor therapy on lisinopril/HCTZ. Patient states that he does not want to have blood drawn. Discussed with patient need to monitor electrolytes and kidney function with lisinopril-HCTZ. Patient reiterated that he would not like to have blood drawn. Extensively discussed therapy options with patient. Recommended transitioning onto daily amlodipine as an alternative. Patient stated that he would like to transition onto amlodipine and avoid getting blood drawn.   Provided prescription for amlodipine 5 mg daily. Recommended follow-up in 2 weeks to recheck blood pressure. -     amLODIPine (NORVASC) 5 mg tablet; Take 1 Tablet by mouth daily. 2. Hypercholesteremia: Chronic. Uncertain level of control. Previously recommended lipid panel in August 2021. Patient states that he does not want to have his blood drawn. We will provide refill of atorvastatin 40 mg daily. Recommend having lipid level drawn. Notify patient that he can come back to the lab at his convenience if he would like to have his blood drawn.  -     atorvastatin (LIPITOR) 40 mg tablet; Take 1 Tablet by mouth nightly. 3., 4., 5. Schizophrenia, unspecified type ([facility] / Bipolar disorder in partial remission, most recent episode unspecified type ([facility] / Long term current use of antipsychotic medication: Chronic. Uncertain level of control. Patient previously took Risperdal 6 mg twice daily and Zyprexa 15 mg daily. He also took Zoloft 150 mg daily. Patient states that he has been out of his medications for an uncertain period of time. He also reports that he does not see a psychiatrist.  Given that patient has been out of his medicines for an uncertain period of time, recommended patient follow-up with psychiatry prior to refilling antipsychotic medications. Will need careful monitoring of therapy to titrate to therapeutic levels, which will require regular follow-up with psychiatry. Patient declined referral to psychiatry. Advised patient that we will be unable to refill antipsychotic medications without regular follow-up with psychiatrist.  Provided patient with written information regarding [facility] (an online psychiatry service). 6. Toe pain, left: Patient presents with left toe pain for the past week. He also reports redness and swelling. He reports a past history of gout along with current consumption of beer and pork. Pt declines XR of L foot today.   Past radiograph of the right foot demonstrated osteoarthritis at the MTP joint. On exam, there is mild swelling and minimal tenderness of the left MTP joint. No overlying erythema. 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 Less likely gout given absence of erythema, warmth. Will trial a course of colchicine 0.6 mg daily. Recommended follow-up in 2 weeks as per above for blood pressure. -     colchicine 0.6 mg tablet; Take 1 Tablet by mouth daily for 30 days. Declined flu and COVID vaccines. Follow-up and Dispositions    Return in about 2 weeks (around 1/20/2023) for re-check blood pressure. Austin Choe expressed understanding of this plan. An AVS was printed and given to the patient. Pt discussed with Dr. Lena Wolfe (Attending Physician),    Harshal Carballo MD  Family Medicine Resident    Please note that this dictation was completed with [employer], the computer voice recognition software. Quite often unanticipated grammatical, syntax, homophones, and other interpretive errors are inadvertently transcribed by the computer software. Please disregard these errors. Please excuse any errors that have escaped final proofreading.

## 2023-01-06 NOTE — PROGRESS NOTES
Chief Complaint   Patient presents with    Follow-up     Blood pressure    Toe Pain     Left great toe, greater than 1 week duration. 1. \"Have you been to the ER, urgent care clinic since your last visit? Hospitalized since your last visit? \" No    2. \"Have you seen or consulted any other health care providers outside of the [address] [place] [address] since your last visit? \" No     3. For patients aged 39-70: Has the patient had a colonoscopy / FIT/ Cologuard? No      If the patient is female:    4. For patients aged 41-77: Has the patient had a mammogram within the past 2 years? NA - based on age or sex      11. For patients aged 21-65: Has the patient had a pap smear?  NA - based on age or sex    Health Maintenance Due   Topic Date Due    Colorectal Cancer Screening Combo  Never done    Shingles Vaccine (1 of 2) Never done    COVID-19 Vaccine (3 - Booster for Pfizer series) 10/15/2021    Flu Vaccine (1) Never done    Medicare Yearly Exam  01/05/2023

## 2023-01-06 NOTE — TELEPHONE ENCOUNTER
Patient called, stated he wanted to ensure there was documentation that he was seen today and that he did not receive refills for his psych medications. Notified patient that documentation from todays visit is in his medical records.  Notified patient as well that referral will be sent to [facility] today for psychiatrist.

## 2023-01-06 NOTE — PROGRESS NOTES
I saw and evaluated the patient, performing the key elements of the service. I discussed the findings, assessment and plan with the resident and agree with the resident's findings and plan as documented in the resident's note. Pt advised multiple times on the importance of having labs drawn but refuses. Because of this will switch to amlodipine which is less likely to affect electrolytes or kidney function, however pt advised that we still strongly recommend he have labs done to monitor these things as well as other health concerns, like his LFT's that were elevated previously. Our hope is that once he can establish with Psychiatry and get on an appropriate regimen that he will be amenable to this.

## 2023-01-06 NOTE — TELEPHONE ENCOUNTER
Pt would like for Dr Orion Edwards to contact him. Pt would not elaborate on the issue. Please advise.

## 2023-01-12 NOTE — TELEPHONE ENCOUNTER
Pt reports that he never received call from [facility]. He states that he really need his psychotic medications. He feels jittery and he does not want to do anything out of the norm from not being on his meds. He is begging for someone to please help him get his medications.

## 2023-01-13 NOTE — TELEPHONE ENCOUNTER
Patient called, notified that we will send referral to [place] in [facility] and that they will want to see patient in person. Notified that they require patient to call to schedule appt and given phone number. Thanked for information. Referral faxed with positive confirmation.

## 2023-01-17 ENCOUNTER — TELEPHONE (OUTPATIENT)
Dept: FAMILY MEDICINE CLINIC | Age: 58
End: 2023-01-17

## 2023-01-17 NOTE — TELEPHONE ENCOUNTER
Pt was scheduled to see the doctor today for a BP check but appt was rescheduled b/c  out of office.  Pt states that he was asked to check his BP at home and today it was 127/87

## 2023-02-05 NOTE — PROGRESS NOTES
Note written with assistance of voice recognition software. Chief Complaint   Patient presents with    Blood Pressure Check       History of Present Illness:  Shyann Ruelas is a 65 y.o. male PMHx of HTN, HLD, bipolar disorder, schizophrenia who presents to clinic for follow-up of HTN. HTN: Pt previously took lisinopril-HCTZ. Seen in clinic on 1/6/23. Pt did not desire labs to be drawn. Recommended switching to amlodipine if unwilling to get labs drawn to avoid potential unrecognized adverse effects of ACEi, diuretic. Bipolar disorder / schizophrenia: Referred to psychiatry at previous visit. - Needs refill on Risperdal, Zyprexa, and sertraline.  - Previously followed with [facility]. Reportedly dismissed after missing multiple appointments due to difficulties with transportation.  - Has been unable to get an appointment with a psychiatrist after multiple referral attempts. - States that he will try to reestablish care with [facility].  - Reports seeing and hearing things occasionally that other people do not hear or see. Provided with prescription for Shingles vaccine at visit on 8/12/22. Current Outpatient Medications   Medication Sig Dispense Refill    risperiDONE (RisperDAL) 4 mg tablet Take 1 Tablet by mouth two (2) times a day. 60 Tablet 0    OLANZapine (ZyPREXA) 15 mg tablet Take 1 Tablet by mouth nightly. 30 Tablet 0    sertraline (ZOLOFT) 100 mg tablet Take 1 Tablet by mouth daily. 30 Tablet 0    sertraline (ZOLOFT) 50 mg tablet Take 1 Tablet by mouth daily. Take in addition to 100mg tablet to equal a total of 150mg daily 30 Tablet 3    risperiDONE (RisperDAL) 2 mg tablet TAKE ONE TABLET BY MOUTH TWICE DAILY ALONG WITH 4 MG FOR A TOTAL OF 6 MG TWICE DAILY 60 Tablet 0    amLODIPine (NORVASC) 10 mg tablet Take 1 Tablet by mouth daily. 60 Tablet 0    atorvastatin (LIPITOR) 40 mg tablet Take 1 Tablet by mouth nightly.  30 Tablet 2    colchicine (MITIGARE) 0.6 mg capsule Take 1 Capsule by mouth daily. 30 Capsule 0    lisinopril-hydroCHLOROthiazide (PRINZIDE, ZESTORETIC) 20-12.5 mg per tablet Take 1 Tablet by mouth in the morning. 90 Tablet 0       Allergies   Allergen Reactions    Penicillin G Swelling     Tongue swelling       Social History     Tobacco Use    Smoking status: Never    Smokeless tobacco: Never   Substance Use Topics    Alcohol use: No    Drug use: No       Family History   Problem Relation Age of Onset    Heart Disease Mother        Physical Exam:     Visit Vitals  BP (!) 156/99 (BP 1 Location: Left upper arm, BP Patient Position: Sitting, BP Cuff Size: Large adult)   Pulse (!) 103   Temp 98.3 °F (36.8 °C) (Oral)   Resp 18   Ht 5' 10\" (1.778 m)   Wt 187 lb (84.8 kg)   SpO2 98%   BMI 26.83 kg/m²       Physical Examination:  General: NAD  CV: Heart: Regular rate. Resp: Breathing comfortably on room air. Neuro: Awake, alert, and appropriately conversant. Psych: Speech linear and logical.  Well-groomed. Mood and affect appear normal.      Assessment/Plan:    Diagnoses and all orders for this visit:    1., 2. Schizophrenia, unspecified type ([facility] / Bipolar disorder in partial remission, most recent episode unspecified type [facility]): Reports occasional hallucinations. He had been taking Risperdal 6 mg twice daily, Zyprexa 50 mg nightly, and sertraline 150 mg daily since he was dismissed from [address] [place] [address] psychiatry service after missing multiple appointments in the setting of reported transportation difficulties. He has since run out of his medications. During previous office visits, provided multiple referrals to psychiatry to attempt to re-establish care for medication management. Patient reports that he has had difficulty getting an appointment with a psychiatrist with multiple attempts.   Given the risk for exacerbation of underlying psychiatric conditions if he is without his medications for a prolonged period of time, provided refills of his most recent documented regimen (Resporal 6 mg twice daily, Zyprexa 15 mg nightly, Zoloft 150 mg daily). We extensively discussed that it is very important to reestablish care with psychiatry as soon as possible. Mr. Osman Grey states that he will contact [facility] to attempt to reestablish care. Recommended in office follow-up in 1 week. -     risperiDONE (RisperDAL) 4 mg tablet; Take 1 Tablet by mouth two (2) times a day. -     OLANZapine (ZyPREXA) 15 mg tablet; Take 1 Tablet by mouth nightly. -     sertraline (ZOLOFT) 100 mg tablet; Take 1 Tablet by mouth daily. -     sertraline (ZOLOFT) 50 mg tablet; Take 1 Tablet by mouth daily. Take in addition to 100mg tablet to equal a total of 150mg daily  -     risperiDONE (RisperDAL) 2 mg tablet; TAKE ONE TABLET BY MOUTH TWICE DAILY ALONG WITH 4 MG FOR A TOTAL OF 6 MG TWICE DAILY    3. Essential hypertension: Chronic. Blood pressure 156/99 today. Not at goal.  Previously took lisinopril-hydrochlorothiazide 20-12.5 mg daily. However at last visit, patient refused labs including electrolytes and creatinine. As a result, we were unfortunately unable to refill his Prinzide. He elected to transition onto amlodipine 5 mg daily at that time. Given blood pressure above goal today, recommended increasing Norvasc dose to 10 mg daily. Recommended follow-up in 1 week. Recommended daily blood pressure monitoring at home and to call office if blood pressure significantly greater than 140/90.  -     amLODIPine (NORVASC) 10 mg tablet; Take 1 Tablet by mouth daily. Follow-up and Dispositions    Return in about 1 week (around 2/14/2023) for blood pressure, medication dosages. Nickcathy Saunders expressed understanding of this plan. An AVS was printed and given to the patient. Pt discussed with Dr. Tracy Swanson (Attending Physician),    Tee Pardo MD  Family Medicine Resident    Please note that this dictation was completed with [employer], the [employer] voice recognition software. Quite often unanticipated grammatical, syntax, homophones, and other interpretive errors are inadvertently transcribed by the computer software. Please disregard these errors. Please excuse any errors that have escaped final proofreading.

## 2023-02-07 ENCOUNTER — OFFICE VISIT (OUTPATIENT)
Dept: FAMILY MEDICINE CLINIC | Age: 58
End: 2023-02-07
Payer: MEDICARE

## 2023-02-07 VITALS
WEIGHT: 187 LBS | SYSTOLIC BLOOD PRESSURE: 156 MMHG | RESPIRATION RATE: 18 BRPM | HEART RATE: 103 BPM | OXYGEN SATURATION: 98 % | BODY MASS INDEX: 26.77 KG/M2 | DIASTOLIC BLOOD PRESSURE: 99 MMHG | HEIGHT: 70 IN | TEMPERATURE: 98.3 F

## 2023-02-07 DIAGNOSIS — I10 ESSENTIAL HYPERTENSION: ICD-10-CM

## 2023-02-07 DIAGNOSIS — F31.70 BIPOLAR DISORDER IN PARTIAL REMISSION, MOST RECENT EPISODE UNSPECIFIED TYPE (HCC): ICD-10-CM

## 2023-02-07 DIAGNOSIS — F20.9 SCHIZOPHRENIA, UNSPECIFIED TYPE (HCC): Primary | ICD-10-CM

## 2023-02-07 DIAGNOSIS — Z79.899 LONG TERM CURRENT USE OF ANTIPSYCHOTIC MEDICATION: ICD-10-CM

## 2023-02-07 RX ORDER — SERTRALINE HYDROCHLORIDE 100 MG/1
100 TABLET, FILM COATED ORAL DAILY
Qty: 30 TABLET | Refills: 0 | Status: SHIPPED | OUTPATIENT
Start: 2023-02-07

## 2023-02-07 RX ORDER — SERTRALINE HYDROCHLORIDE 50 MG/1
50 TABLET, FILM COATED ORAL DAILY
Qty: 30 TABLET | Refills: 3 | Status: SHIPPED | OUTPATIENT
Start: 2023-02-07

## 2023-02-07 RX ORDER — OLANZAPINE 15 MG/1
15 TABLET ORAL
Qty: 30 TABLET | Refills: 0 | Status: SHIPPED | OUTPATIENT
Start: 2023-02-07

## 2023-02-07 RX ORDER — RISPERIDONE 2 MG/1
TABLET, FILM COATED ORAL
Qty: 60 TABLET | Refills: 0 | Status: SHIPPED | OUTPATIENT
Start: 2023-02-07

## 2023-02-07 RX ORDER — AMLODIPINE BESYLATE 10 MG/1
10 TABLET ORAL DAILY
Qty: 60 TABLET | Refills: 0 | Status: SHIPPED | OUTPATIENT
Start: 2023-02-07

## 2023-02-07 RX ORDER — RISPERIDONE 4 MG/1
4 TABLET, FILM COATED ORAL 2 TIMES DAILY
Qty: 60 TABLET | Refills: 0 | Status: SHIPPED | OUTPATIENT
Start: 2023-02-07

## 2023-02-07 NOTE — PROGRESS NOTES
Chief Complaint   Patient presents with    Blood Pressure Check     1. \"Have you been to the ER, urgent care clinic since your last visit? Hospitalized since your last visit? \" No    2. \"Have you seen or consulted any other health care providers outside of the [address] [place] [address] since your last visit? \" No     3. For patients aged 39-70: Has the patient had a colonoscopy / FIT/ Cologuard? No      If the patient is female:    4. For patients aged 41-77: Has the patient had a mammogram within the past 2 years? NA - based on age or sex      11. For patients aged 21-65: Has the patient had a pap smear?  NA - based on age or sex    Health Maintenance Due   Topic Date Due    Colorectal Cancer Screening Combo  Never done    Shingles Vaccine (1 of 2) Never done    COVID-19 Vaccine (3 - Booster for Pfizer series) 10/15/2021    Flu Vaccine (1) Never done    Medicare Yearly Exam  Never done    Lipid Screen  02/01/2023

## 2023-02-07 NOTE — PATIENT INSTRUCTIONS
Please call us if your blood pressure at home is consistently above 140 for the top number or above 90 for the bottom number.

## 2023-02-14 ENCOUNTER — OFFICE VISIT (OUTPATIENT)
Dept: FAMILY MEDICINE CLINIC | Age: 58
End: 2023-02-14
Payer: MEDICARE

## 2023-02-14 VITALS
BODY MASS INDEX: 26.92 KG/M2 | SYSTOLIC BLOOD PRESSURE: 154 MMHG | RESPIRATION RATE: 18 BRPM | HEART RATE: 92 BPM | OXYGEN SATURATION: 99 % | DIASTOLIC BLOOD PRESSURE: 85 MMHG | TEMPERATURE: 98.4 F | HEIGHT: 70 IN | WEIGHT: 188 LBS

## 2023-02-14 DIAGNOSIS — I10 ELEVATED BLOOD PRESSURE READING WITH DIAGNOSIS OF HYPERTENSION: Primary | ICD-10-CM

## 2023-02-14 DIAGNOSIS — F10.20 ALCOHOL DEPENDENCE, UNCOMPLICATED (HCC): ICD-10-CM

## 2023-02-14 DIAGNOSIS — F31.70 BIPOLAR DISORDER IN PARTIAL REMISSION, MOST RECENT EPISODE UNSPECIFIED TYPE (HCC): ICD-10-CM

## 2023-02-14 DIAGNOSIS — F20.9 SCHIZOPHRENIA, UNSPECIFIED TYPE (HCC): ICD-10-CM

## 2023-02-14 LAB
AMPHETAMINE QL URINE POC: NEGATIVE
BARBITURATES UR POC: NEGATIVE
BENZODIAZEPINES UR POC: NEGATIVE
COCAINE QL URINE POC: NEGATIVE
LOT EXP DATE POC: NORMAL
LOT NUMBER POC: NORMAL
MARIJUANA (THC) QL URINE POC: NEGATIVE
MDMA/ECSTASY UR POC: NEGATIVE
METHADONE QL URINE POC: NEGATIVE
METHAMPHETAMINE QL URINE POC: NEGATIVE
OPIATES 300 QL URINE POC: NEGATIVE
OXYCODONE UR POC: NEGATIVE
PHENCYCLIDINE QL URINE POC: NEGATIVE
TRICYCLIC ANTIDEPRESSANTS (TCA) QL URINE POC: NEGATIVE

## 2023-02-14 PROCEDURE — G9717 DOC PT DX DEP/BP F/U NT REQ: HCPCS | Performed by: STUDENT IN AN ORGANIZED HEALTH CARE EDUCATION/TRAINING PROGRAM

## 2023-02-14 PROCEDURE — 3074F SYST BP LT 130 MM HG: CPT | Performed by: STUDENT IN AN ORGANIZED HEALTH CARE EDUCATION/TRAINING PROGRAM

## 2023-02-14 PROCEDURE — 99214 OFFICE O/P EST MOD 30 MIN: CPT | Performed by: STUDENT IN AN ORGANIZED HEALTH CARE EDUCATION/TRAINING PROGRAM

## 2023-02-14 PROCEDURE — 3017F COLORECTAL CA SCREEN DOC REV: CPT | Performed by: STUDENT IN AN ORGANIZED HEALTH CARE EDUCATION/TRAINING PROGRAM

## 2023-02-14 PROCEDURE — 3078F DIAST BP <80 MM HG: CPT | Performed by: STUDENT IN AN ORGANIZED HEALTH CARE EDUCATION/TRAINING PROGRAM

## 2023-02-14 PROCEDURE — G8427 DOCREV CUR MEDS BY ELIG CLIN: HCPCS | Performed by: STUDENT IN AN ORGANIZED HEALTH CARE EDUCATION/TRAINING PROGRAM

## 2023-02-14 PROCEDURE — G8419 CALC BMI OUT NRM PARAM NOF/U: HCPCS | Performed by: STUDENT IN AN ORGANIZED HEALTH CARE EDUCATION/TRAINING PROGRAM

## 2023-02-14 PROCEDURE — 80305 DRUG TEST PRSMV DIR OPT OBS: CPT | Performed by: STUDENT IN AN ORGANIZED HEALTH CARE EDUCATION/TRAINING PROGRAM

## 2023-02-14 RX ORDER — HYDROCHLOROTHIAZIDE 12.5 MG/1
12.5 TABLET ORAL DAILY
Qty: 30 TABLET | Refills: 0 | Status: SHIPPED | OUTPATIENT
Start: 2023-02-14

## 2023-02-14 NOTE — PROGRESS NOTES
Chief Complaint   Patient presents with    Follow-up     Blood pressure       History of Present Illness:  Bernardo Schaefer is a 65 y.o. male w/ PMHx of HTN, HLD, bipolar disorder, schizophrenia who presents to clinic for follow-up. - Had been seen previously on 1/6/23. Recommended follow-up w/ psychiatry for management of his antipsychotic/antidepressant medication combination.  - Pt followed-up in clinic on 2/7/23. He reported at that time that he had not been unable to [facility] with [facility] or establish care with another psychiatrist.  - Zyprexa, Risperdal, and Zoloft were re-filled on 2/7/23. Recommended follow-up with psychiatry/[facility].  - Reports decrease in seeing \"shadows\" that other people do not see. - Reports decrease in auditory hallucinations. Hearing fewer voices. States still hears voices calling his name.  - Reports sleeping well. - Drinking ~4 beers per day. - Denies drug use. Denies hx of drug use. - Reports that he has not called [facility] yet. HTN: Amlodipine increased to 10 mg daily on 2/7. Of note, recommended transitioning off of lisinopril-HCTZ onto amlodipine 5 mg daily on 1/6/23 as pt refused labs to check electrolytes and creatinine.  - Has been measuring BP at home - ranging [id number] systolic. History reviewed. No pertinent past medical history. Current Outpatient Medications   Medication Sig Dispense Refill    hydroCHLOROthiazide (HYDRODIURIL) 12.5 mg tablet Take 1 Tablet by mouth daily. 30 Tablet 0    risperiDONE (RisperDAL) 4 mg tablet Take 1 Tablet by mouth two (2) times a day. 60 Tablet 0    OLANZapine (ZyPREXA) 15 mg tablet Take 1 Tablet by mouth nightly. 30 Tablet 0    sertraline (ZOLOFT) 100 mg tablet Take 1 Tablet by mouth daily. 30 Tablet 0    sertraline (ZOLOFT) 50 mg tablet Take 1 Tablet by mouth daily.  Take in addition to 100mg tablet to equal a total of 150mg daily 30 Tablet 3    risperiDONE (RisperDAL) 2 mg tablet TAKE ONE TABLET BY MOUTH TWICE DAILY ALONG WITH 4 MG FOR A TOTAL OF 6 MG TWICE DAILY 60 Tablet 0    amLODIPine (NORVASC) 10 mg tablet Take 1 Tablet by mouth daily. 60 Tablet 0    atorvastatin (LIPITOR) 40 mg tablet Take 1 Tablet by mouth nightly. 30 Tablet 2    colchicine (MITIGARE) 0.6 mg capsule Take 1 Capsule by mouth daily. 30 Capsule 0       Allergies   Allergen Reactions    Penicillin G Swelling     Tongue swelling       Social History     Tobacco Use    Smoking status: Never    Smokeless tobacco: Never   Substance Use Topics    Alcohol use: No    Drug use: No       Family History   Problem Relation Age of Onset    Heart Disease Mother        Physical Exam:     Visit Vitals  BP (!) 154/85 (BP 1 Location: Right arm, BP Patient Position: Sitting, BP Cuff Size: Large adult)   Pulse 92   Temp 98.4 °F (36.9 °C) (Oral)   Resp 18   Ht 5' 10\" (1.778 m)   Wt 188 lb (85.3 kg)   SpO2 99%   BMI 26.98 kg/m²       Physical Examination:  General: NAD. CV: Heart: Tachycardic rate, regular rhythm. Normal S1 and S2. No murmurs. Resp: Breathing comfortably on room air. Lungs clear to auscultation bilaterally with good air movement. No wheezing. Abdomen: Nontender to palpation. No guarding or rebound. Extremities: No lower extremity edema. Neuro: Awake, alert, and appropriately conversant. Psych: Well-groomed. Speech linear and logical.  Mood and affect appropriate. Assessment/Plan:    Diagnoses and all orders for this visit:    1. Elevated blood pressure reading with diagnosis of hypertension: /85 initially and 154/85 on recheck. On review of home values, systolic blood pressure ranges 160s to 180s despite taking amlodipine 10 mg daily. Patient denies illicit drug use. Recommended urine drug screen. Recommended obtaining routine screening lab work including CBC, CMP, lipid panel. Recommended starting HCTZ 12.5 mg daily in addition to amlodipine 10 mg daily.   We will follow-up electrolytes and kidney function and adjust antihypertensive regimen as appropriate. Recommended in office follow-up in 1 week. Recommended continuing home blood pressure monitoring.  -     AMB POC USCREEN 12 DRUG   -     CBC W/O DIFF; Future  -     METABOLIC PANEL, COMPREHENSIVE; Future  -     LIPID PANEL; Future  -     hydroCHLOROthiazide (HYDRODIURIL) 12.5 mg tablet; Take 1 Tablet by mouth daily. 2. Schizophren[facility] unspecified [facility] ([facility]): Chronic. Reports auditory and visual hallucinations are improved with current antipsychotic regimen. Recommended continuing current medicines and following up with [facility] as soon as possible. Provided number for [facility]. Recommended in office follow-up in 1 week. 3. Bipolar disorder in partial remission, most recent episode unspecified type [facility]): Chronic. Patient reports sleeping well on current regimen. Recommended continuing current regimen and following up with [facility] as soon as possible. Provided number for [facility]. Recommended in office follow-up in 1 week. 4. Alcohol dependence, uncomplicated ([facility]): Patient reports drinking \"4 beers per day. \"  Denies illicit drug use. Recommended follow-up with [facility]. Recommended urine drug screen to rule out other causes of hypertension. Recommended in office follow-up in 1 week. -     AMB POC USCREEN 12 DRUG       Follow-up and Dispositions    Return in about 1 week (around 2/21/2023) for follow-up blood pressure. Jenniffer Hays expressed understanding of this plan. An AVS was printed and given to the patient. Pt discussed with Dr. Carri Burnette (Attending Physician),    Norma Roblero MD  Family Medicine Resident    Please note that this dictation was completed with SSEV, the [employer] voice recognition software. Quite often unanticipated grammatical, syntax, homophones, and other interpretive errors are inadvertently transcribed by the computer software.  Please disregard these errors. Please excuse any errors that have escaped final proofreading.

## 2023-02-14 NOTE — PROGRESS NOTES
Chief Complaint   Patient presents with    Follow-up     Blood pressure     1. \"Have you been to the ER, urgent care clinic since your last visit? Hospitalized since your last visit? \" No    2. \"Have you seen or consulted any other health care providers outside of the [address] [place] [address] since your last visit? \" No     3. For patients aged 39-70: Has the patient had a colonoscopy / FIT/ Cologuard? No      If the patient is female:    4. For patients aged 41-77: Has the patient had a mammogram within the past 2 years? NA - based on age or sex      11. For patients aged 21-65: Has the patient had a pap smear?  NA - based on age or sex    Health Maintenance Due   Topic Date Due    Colorectal Cancer Screening Combo  Never done    Shingles Vaccine (1 of 2) Never done    COVID-19 Vaccine (3 - Booster for [employer] series) 10/15/2021    Flu Vaccine (1) Never done    Medicare Yearly Exam  Never done    Lipid Screen  02/01/2023

## 2023-02-15 LAB
ALBUMIN SERPL-MCNC: 4.4 G/DL (ref 3.5–5)
ALBUMIN/GLOB SERPL: 1.3 (ref 1.1–2.2)
ALP SERPL-CCNC: 142 U/L (ref 45–117)
ALT SERPL-CCNC: 56 U/L (ref 12–78)
ANION GAP SERPL CALC-SCNC: 7 MMOL/L (ref 5–15)
AST SERPL-CCNC: 71 U/L (ref 15–37)
BILIRUB SERPL-MCNC: 0.4 MG/DL (ref 0.2–1)
BUN SERPL-MCNC: 10 MG/DL (ref 6–20)
BUN/CREAT SERPL: 8 (ref 12–20)
CALCIUM SERPL-MCNC: 9.8 MG/DL (ref 8.5–10.1)
CHLORIDE SERPL-SCNC: 109 MMOL/L (ref 97–108)
CHOLEST SERPL-MCNC: 215 MG/DL
CO2 SERPL-SCNC: 25 MMOL/L (ref 21–32)
CREAT SERPL-MCNC: 1.2 MG/DL (ref 0.7–1.3)
ERYTHROCYTE [DISTWIDTH] IN BLOOD BY AUTOMATED COUNT: 12 % (ref 11.5–14.5)
GLOBULIN SER CALC-MCNC: 3.4 G/DL (ref 2–4)
GLUCOSE SERPL-MCNC: 98 MG/DL (ref 65–100)
HCT VFR BLD AUTO: 44.3 % (ref 36.6–50.3)
HDLC SERPL-MCNC: 90 MG/DL
HDLC SERPL: 2.4 (ref 0–5)
HGB BLD-MCNC: 14.9 G/DL (ref 12.1–17)
LDLC SERPL CALC-MCNC: 100.2 MG/DL (ref 0–100)
MCH RBC QN AUTO: 30.3 PG (ref 26–34)
MCHC RBC AUTO-ENTMCNC: 33.6 G/DL (ref 30–36.5)
MCV RBC AUTO: 90 FL (ref 80–99)
NRBC # BLD: 0 K/UL (ref 0–0.01)
NRBC BLD-RTO: 0 PER 100 WBC
PLATELET # BLD AUTO: 268 K/UL (ref 150–400)
PMV BLD AUTO: 9.7 FL (ref 8.9–12.9)
POTASSIUM SERPL-SCNC: 4 MMOL/L (ref 3.5–5.1)
PROT SERPL-MCNC: 7.8 G/DL (ref 6.4–8.2)
RBC # BLD AUTO: 4.92 M/UL (ref 4.1–5.7)
SODIUM SERPL-SCNC: 141 MMOL/L (ref 136–145)
TRIGL SERPL-MCNC: 124 MG/DL (ref ?–150)
VLDLC SERPL CALC-MCNC: 24.8 MG/DL
WBC # BLD AUTO: 4.6 K/UL (ref 4.1–11.1)

## 2023-02-18 NOTE — PROGRESS NOTES
- Tchol 215, trig 124, HDL 90, .2. ASCVD risk 13.7%. Recommend increasing Lipitor from 40 mg to 80 mg daily. - Creatinine 1.2 (at baseline), eGFR >60.  - ALT 56 (prev 76 one year ago), AST 71 (prev 96). Hx of EtOH use. Acute hepatitis panel negative in April 2014. Likely EtOH-related. Consider RUQ US, ferritin, and repeat acute hepatitis panel.

## 2023-02-20 NOTE — PROGRESS NOTES
Chief Complaint   Patient presents with    Blood Pressure Check       History of Present Illness:  Kiara Saab is a 65 y.o. male w/ PMHx of HTN, HLD, bipolar disorder, schizophrenia who presents to clinic for follow-up. - Previously seen in clinic on 2/14/23. - Zyprexa, Risperdal, and Zoloft were re-filled on 2/7/23.  - Reported decrease in auditory/visual hallucinations on 2/14/23.  - Reports sleeping well. - Drinking ~4 beers per day. - Denies drug use. Denies hx of drug use. UDS negative on 2/14.  - Reports that he has not called [facility] yet. HTN: Labs done on 2/14/23. Electrolytes and kidney function ok. Amlodipine increased to 10 mg daily on 2/7. HCTZ 12.5 added on 2/14.   - Haven't been measuring BP at home. HLD: Lipid panel on 2/14: Tchol 215, trig 124, HDL 90, .2. ASCVD risk 13.7%. - Currently on Lipitor 40 mg daily. EtOH abuse:  - Drinking ~4 beers per day. - ALT 56 (prev 76 one year ago), AST 71 (prev 96). Hx of EtOH use. Acute hepatitis panel negative in April 2014. History reviewed. No pertinent past medical history. Current Outpatient Medications   Medication Sig Dispense Refill    atorvastatin (LIPITOR) 80 mg tablet Take 1 Tablet by mouth daily. 90 Tablet 1    hydroCHLOROthiazide (HYDRODIURIL) 25 mg tablet Take 1 Tablet by mouth daily. 90 Tablet 0    risperiDONE (RisperDAL) 4 mg tablet Take 1 Tablet by mouth two (2) times a day. 60 Tablet 0    OLANZapine (ZyPREXA) 15 mg tablet Take 1 Tablet by mouth nightly. 30 Tablet 0    sertraline (ZOLOFT) 100 mg tablet Take 1 Tablet by mouth daily. 30 Tablet 0    sertraline (ZOLOFT) 50 mg tablet Take 1 Tablet by mouth daily. Take in addition to 100mg tablet to equal a total of 150mg daily 30 Tablet 3    risperiDONE (RisperDAL) 2 mg tablet TAKE ONE TABLET BY MOUTH TWICE DAILY ALONG WITH 4 MG FOR A TOTAL OF 6 MG TWICE DAILY 60 Tablet 0    amLODIPine (NORVASC) 10 mg tablet Take 1 Tablet by mouth daily.  60 Tablet 0 colchicine (MITIGARE) 0.6 mg capsule Take 1 Capsule by mouth daily. (Patient not taking: Reported on 2/21/2023) 30 Capsule 0       Allergies   Allergen Reactions    Penicillin G Swelling     Tongue swelling       Social History     Tobacco Use    Smoking status: Never    Smokeless tobacco: Never   Substance Use Topics    Alcohol use: No    Drug use: No       Family History   Problem Relation Age of Onset    Heart Disease Mother        Physical Exam:     Visit Vitals  BP (!) 165/103   Pulse 95   Temp 98.4 °F (36.9 °C)   Resp 16   Ht 5' 10\" (1.778 m)   Wt 188 lb (85.3 kg)   SpO2 98%   BMI 26.98 kg/m²       Physical Examination:  General: NAD  CV: Heart: Regular rate. Resp: Breathing comfortably on room air. Neuro: Awake, alert, and appropriately conversant. Assessment/Plan:    Diagnoses and all orders for this visit:    1. Essential hypertension: Chronic, uncontrolled (/103 today). Currently taking amlodipine 10 mg daily and HCTZ 12.5 mg daily. Recommended increasing dose of HCTZ to 25 mg daily in addition to amlodipine 10 mg daily. Recommended daily home blood pressure monitoring. Recommended follow-up in 2 weeks to remeasure blood pressure. -     hydroCHLOROthiazide (HYDRODIURIL) 25 mg tablet; Take 1 Tablet by mouth daily. 2. Hypercholesteremia: Lipid panel on 2/14: Tchol 215, trig 124, HDL 90, .2. ASCVD risk 13.7%. LDL remains above goal.  Recommended increasing Lipitor dose from 40 mg daily to 80 mg daily. -     atorvastatin (LIPITOR) 80 mg tablet; Take 1 Tablet by mouth daily. 3. Bipolar disorder in partial remission, most recent episode unspecified type [facility]): Chronic, improved on current regimen. Continue current regimen. 4. Schizophrenia, unspecified type [facility]): Chronic, improved on current regimen. Continue current regimen. 5. Elevated AST (SGOT): ALT 56 (prev 76 one year ago), AST 71 (prev 96). Hx of EtOH use. Acute hepatitis panel negative in April 2014.   Elevation likely due to EtOH use. Also consider statin. Consider obtaining repeat CMP, ferritin, repeat acute hepatitis panel at follow-up. Follow-up and Dispositions    Return in about 2 weeks (around 3/7/2023) for re-check BP. Delio Welsh expressed understanding of this plan. An AVS was printed and given to the patient. Pt discussed with Dr. Gin Bustillo (Attending Physician),    Katy Vidal MD  Family Medicine Resident    Please note that this dictation was completed with [employer], the computer voice recognition software. Quite often unanticipated grammatical, syntax, homophones, and other interpretive errors are inadvertently transcribed by the computer software. Please disregard these errors. Please excuse any errors that have escaped final proofreading.

## 2023-02-21 ENCOUNTER — OFFICE VISIT (OUTPATIENT)
Dept: FAMILY MEDICINE CLINIC | Age: 58
End: 2023-02-21
Payer: MEDICARE

## 2023-02-21 VITALS
OXYGEN SATURATION: 98 % | HEIGHT: 70 IN | DIASTOLIC BLOOD PRESSURE: 103 MMHG | BODY MASS INDEX: 26.92 KG/M2 | SYSTOLIC BLOOD PRESSURE: 165 MMHG | RESPIRATION RATE: 16 BRPM | TEMPERATURE: 98.4 F | WEIGHT: 188 LBS | HEART RATE: 95 BPM

## 2023-02-21 DIAGNOSIS — R74.01 ELEVATED AST (SGOT): ICD-10-CM

## 2023-02-21 DIAGNOSIS — E78.00 HYPERCHOLESTEREMIA: ICD-10-CM

## 2023-02-21 DIAGNOSIS — F20.9 SCHIZOPHRENIA, UNSPECIFIED TYPE (HCC): ICD-10-CM

## 2023-02-21 DIAGNOSIS — F31.70 BIPOLAR DISORDER IN PARTIAL REMISSION, MOST RECENT EPISODE UNSPECIFIED TYPE (HCC): ICD-10-CM

## 2023-02-21 DIAGNOSIS — I10 ESSENTIAL HYPERTENSION: Primary | ICD-10-CM

## 2023-02-21 PROCEDURE — 3077F SYST BP >= 140 MM HG: CPT | Performed by: STUDENT IN AN ORGANIZED HEALTH CARE EDUCATION/TRAINING PROGRAM

## 2023-02-21 PROCEDURE — 99214 OFFICE O/P EST MOD 30 MIN: CPT | Performed by: STUDENT IN AN ORGANIZED HEALTH CARE EDUCATION/TRAINING PROGRAM

## 2023-02-21 PROCEDURE — 3017F COLORECTAL CA SCREEN DOC REV: CPT | Performed by: STUDENT IN AN ORGANIZED HEALTH CARE EDUCATION/TRAINING PROGRAM

## 2023-02-21 PROCEDURE — G8427 DOCREV CUR MEDS BY ELIG CLIN: HCPCS | Performed by: STUDENT IN AN ORGANIZED HEALTH CARE EDUCATION/TRAINING PROGRAM

## 2023-02-21 PROCEDURE — G9717 DOC PT DX DEP/BP F/U NT REQ: HCPCS | Performed by: STUDENT IN AN ORGANIZED HEALTH CARE EDUCATION/TRAINING PROGRAM

## 2023-02-21 PROCEDURE — G8419 CALC BMI OUT NRM PARAM NOF/U: HCPCS | Performed by: STUDENT IN AN ORGANIZED HEALTH CARE EDUCATION/TRAINING PROGRAM

## 2023-02-21 PROCEDURE — 3080F DIAST BP >= 90 MM HG: CPT | Performed by: STUDENT IN AN ORGANIZED HEALTH CARE EDUCATION/TRAINING PROGRAM

## 2023-02-21 RX ORDER — ATORVASTATIN CALCIUM 80 MG/1
80 TABLET, FILM COATED ORAL DAILY
Qty: 90 TABLET | Refills: 1 | Status: SHIPPED | OUTPATIENT
Start: 2023-02-21

## 2023-02-21 RX ORDER — HYDROCHLOROTHIAZIDE 25 MG/1
25 TABLET ORAL DAILY
Qty: 90 TABLET | Refills: 0 | Status: SHIPPED | OUTPATIENT
Start: 2023-02-21

## 2023-02-21 NOTE — PROGRESS NOTES
1. Have you been to the ER, urgent care clinic since your last visit? Hospitalized since your last visit? No    2. Have you seen or consulted any other health care providers outside of the [address] [place] [address] since your last visit? Include any pap smears or colon screening. No  Reviewed record in preparation for visit and have necessary documentation  Pt did not bring medication to office visit for review  opportunity was given for questions      3. For patients aged 39-70: Has the patient had a colonoscopy / FIT/ Cologuard? No      If the patient is female:    4. For patients aged 41-77: Has the patient had a mammogram within the past 2 years? NA - based on age or sex      11. For patients aged 21-65: Has the patient had a pap smear?  NA - based on age or sex      Goals that were addressed and/or need to be completed during or after this appointment include   Health Maintenance Due   Topic Date Due    Colorectal Cancer Screening Combo  Never done    Shingles Vaccine (1 of 2) Never done    COVID-19 Vaccine (3 - Booster for [employer] series) 10/15/2021    Medicare Yearly Exam  Never done

## 2023-07-10 RX ORDER — RISPERIDONE 4 MG/1
TABLET ORAL
Qty: 60 TABLET | Refills: 0 | Status: SHIPPED | OUTPATIENT
Start: 2023-07-10

## 2023-07-10 RX ORDER — RISPERIDONE 2 MG/1
TABLET ORAL
Qty: 60 TABLET | Refills: 0 | Status: SHIPPED | OUTPATIENT
Start: 2023-07-10

## 2023-07-10 RX ORDER — OLANZAPINE 15 MG/1
TABLET ORAL
Qty: 30 TABLET | Refills: 0 | Status: SHIPPED | OUTPATIENT
Start: 2023-07-10

## 2023-07-10 RX ORDER — SERTRALINE HYDROCHLORIDE 100 MG/1
TABLET, FILM COATED ORAL
Qty: 30 TABLET | Refills: 0 | Status: SHIPPED | OUTPATIENT
Start: 2023-07-10

## 2023-11-03 RX ORDER — SERTRALINE HYDROCHLORIDE 100 MG/1
TABLET, FILM COATED ORAL
Qty: 30 TABLET | Refills: 0 | Status: SHIPPED | OUTPATIENT
Start: 2023-11-03

## 2023-11-03 RX ORDER — OLANZAPINE 15 MG/1
TABLET ORAL
Qty: 30 TABLET | Refills: 0 | Status: SHIPPED | OUTPATIENT
Start: 2023-11-03

## 2023-11-03 RX ORDER — RISPERIDONE 4 MG/1
TABLET ORAL
Qty: 60 TABLET | Refills: 0 | Status: SHIPPED | OUTPATIENT
Start: 2023-11-03

## 2023-11-03 RX ORDER — RISPERIDONE 2 MG/1
TABLET ORAL
Qty: 60 TABLET | Refills: 0 | Status: SHIPPED | OUTPATIENT
Start: 2023-11-03

## 2023-11-03 NOTE — TELEPHONE ENCOUNTER
Pt appt is scheduled fr 11/10, pt would like to know if he can be called in enough medication to last him until his appointment as he desperately need it. Please advise. Sarah Pyle

## 2023-11-03 NOTE — TELEPHONE ENCOUNTER
Patient called and mobile and home phones. Both lines are disconnected. Past due for office visit. If patient calls, please schedule. Letter mailed to patient.

## 2023-11-09 PROBLEM — E78.00 PURE HYPERCHOLESTEROLEMIA: Status: ACTIVE | Noted: 2023-11-09

## 2023-11-10 ENCOUNTER — OFFICE VISIT (OUTPATIENT)
Facility: CLINIC | Age: 58
End: 2023-11-10

## 2023-11-10 VITALS
TEMPERATURE: 98.1 F | DIASTOLIC BLOOD PRESSURE: 113 MMHG | RESPIRATION RATE: 18 BRPM | HEART RATE: 78 BPM | HEIGHT: 70 IN | OXYGEN SATURATION: 99 % | WEIGHT: 183 LBS | BODY MASS INDEX: 26.2 KG/M2 | SYSTOLIC BLOOD PRESSURE: 188 MMHG

## 2023-11-10 DIAGNOSIS — E78.00 PURE HYPERCHOLESTEROLEMIA: ICD-10-CM

## 2023-11-10 DIAGNOSIS — I10 ESSENTIAL HYPERTENSION: Primary | ICD-10-CM

## 2023-11-10 DIAGNOSIS — R79.89 ELEVATED LFTS: ICD-10-CM

## 2023-11-10 DIAGNOSIS — F31.70 BIPOLAR DISORDER, CURRENTLY IN REMISSION, MOST RECENT EPISODE UNSPECIFIED (HCC): ICD-10-CM

## 2023-11-10 DIAGNOSIS — F41.9 ANXIETY: ICD-10-CM

## 2023-11-10 DIAGNOSIS — F20.89 OTHER SCHIZOPHRENIA (HCC): ICD-10-CM

## 2023-11-10 RX ORDER — OLANZAPINE 15 MG/1
15 TABLET ORAL NIGHTLY
Qty: 90 TABLET | Refills: 1 | Status: SHIPPED | OUTPATIENT
Start: 2023-11-10 | End: 2024-05-08

## 2023-11-10 RX ORDER — SERTRALINE HYDROCHLORIDE 100 MG/1
100 TABLET, FILM COATED ORAL DAILY
Qty: 90 TABLET | Refills: 1 | Status: SHIPPED | OUTPATIENT
Start: 2023-11-10 | End: 2024-05-08

## 2023-11-10 RX ORDER — RISPERIDONE 4 MG/1
4 TABLET ORAL 2 TIMES DAILY
Qty: 180 TABLET | Refills: 1 | Status: SHIPPED | OUTPATIENT
Start: 2023-11-10 | End: 2024-05-08

## 2023-11-10 RX ORDER — HYDROCHLOROTHIAZIDE 25 MG/1
25 TABLET ORAL DAILY
Qty: 90 TABLET | Refills: 1 | Status: SHIPPED | OUTPATIENT
Start: 2023-11-10

## 2023-11-10 RX ORDER — ATORVASTATIN CALCIUM 80 MG/1
80 TABLET, FILM COATED ORAL DAILY
Qty: 90 TABLET | Refills: 1 | Status: SHIPPED | OUTPATIENT
Start: 2023-11-10

## 2023-11-10 RX ORDER — AMLODIPINE BESYLATE 10 MG/1
10 TABLET ORAL DAILY
Qty: 90 TABLET | Refills: 1 | Status: SHIPPED | OUTPATIENT
Start: 2023-11-10

## 2023-11-10 RX ORDER — RISPERIDONE 2 MG/1
2 TABLET ORAL 2 TIMES DAILY
Qty: 180 TABLET | Refills: 1 | Status: SHIPPED | OUTPATIENT
Start: 2023-11-10 | End: 2024-05-08

## 2023-11-10 SDOH — ECONOMIC STABILITY: INCOME INSECURITY: HOW HARD IS IT FOR YOU TO PAY FOR THE VERY BASICS LIKE FOOD, HOUSING, MEDICAL CARE, AND HEATING?: PATIENT DECLINED

## 2023-11-10 SDOH — ECONOMIC STABILITY: FOOD INSECURITY: WITHIN THE PAST 12 MONTHS, YOU WORRIED THAT YOUR FOOD WOULD RUN OUT BEFORE YOU GOT MONEY TO BUY MORE.: PATIENT DECLINED

## 2023-11-10 SDOH — ECONOMIC STABILITY: HOUSING INSECURITY
IN THE LAST 12 MONTHS, WAS THERE A TIME WHEN YOU DID NOT HAVE A STEADY PLACE TO SLEEP OR SLEPT IN A SHELTER (INCLUDING NOW)?: PATIENT REFUSED

## 2023-11-10 SDOH — ECONOMIC STABILITY: FOOD INSECURITY: WITHIN THE PAST 12 MONTHS, THE FOOD YOU BOUGHT JUST DIDN'T LAST AND YOU DIDN'T HAVE MONEY TO GET MORE.: PATIENT DECLINED

## 2023-11-12 ASSESSMENT — ENCOUNTER SYMPTOMS
CONSTIPATION: 0
VOMITING: 0
NAUSEA: 0
DIARRHEA: 0
COUGH: 0
SHORTNESS OF BREATH: 0

## 2023-11-21 DIAGNOSIS — R79.89 ELEVATED LFTS: Primary | ICD-10-CM

## 2023-11-21 DIAGNOSIS — R79.89 ELEVATED LFTS: ICD-10-CM

## 2023-11-21 DIAGNOSIS — E78.00 PURE HYPERCHOLESTEROLEMIA: ICD-10-CM

## 2023-11-21 DIAGNOSIS — I10 ESSENTIAL HYPERTENSION: ICD-10-CM

## 2023-11-21 DIAGNOSIS — I10 PRIMARY HYPERTENSION: Primary | ICD-10-CM

## 2023-11-22 LAB
ALBUMIN SERPL-MCNC: 3.7 G/DL (ref 3.5–5)
ALBUMIN/GLOB SERPL: 1.1 (ref 1.1–2.2)
ALP SERPL-CCNC: 133 U/L (ref 45–117)
ALT SERPL-CCNC: 58 U/L (ref 12–78)
ANION GAP SERPL CALC-SCNC: 6 MMOL/L (ref 5–15)
AST SERPL-CCNC: 87 U/L (ref 15–37)
BILIRUB SERPL-MCNC: 0.3 MG/DL (ref 0.2–1)
BUN SERPL-MCNC: 14 MG/DL (ref 6–20)
BUN/CREAT SERPL: 11 (ref 12–20)
CALCIUM SERPL-MCNC: 8.8 MG/DL (ref 8.5–10.1)
CHLORIDE SERPL-SCNC: 98 MMOL/L (ref 97–108)
CHOLEST SERPL-MCNC: 154 MG/DL
CO2 SERPL-SCNC: 30 MMOL/L (ref 21–32)
CREAT SERPL-MCNC: 1.32 MG/DL (ref 0.7–1.3)
GLOBULIN SER CALC-MCNC: 3.3 G/DL (ref 2–4)
GLUCOSE SERPL-MCNC: 131 MG/DL (ref 65–100)
HDLC SERPL-MCNC: 76 MG/DL
HDLC SERPL: 2 (ref 0–5)
LDLC SERPL CALC-MCNC: 56.6 MG/DL (ref 0–100)
POTASSIUM SERPL-SCNC: 3.3 MMOL/L (ref 3.5–5.1)
PROT SERPL-MCNC: 7 G/DL (ref 6.4–8.2)
SODIUM SERPL-SCNC: 134 MMOL/L (ref 136–145)
TRIGL SERPL-MCNC: 107 MG/DL
VLDLC SERPL CALC-MCNC: 21.4 MG/DL

## 2023-11-24 RX ORDER — HYDROCHLOROTHIAZIDE 25 MG/1
12.5 TABLET ORAL DAILY
Qty: 90 TABLET | Refills: 1 | Status: SHIPPED | OUTPATIENT
Start: 2023-11-24

## 2023-11-24 RX ORDER — LOSARTAN POTASSIUM 25 MG/1
25 TABLET ORAL DAILY
Qty: 90 TABLET | Refills: 1 | Status: SHIPPED | OUTPATIENT
Start: 2023-11-24

## 2024-04-29 DIAGNOSIS — I10 ESSENTIAL HYPERTENSION: ICD-10-CM

## 2024-04-30 RX ORDER — AMLODIPINE BESYLATE 10 MG/1
10 TABLET ORAL DAILY
Qty: 90 TABLET | Refills: 1 | Status: SHIPPED | OUTPATIENT
Start: 2024-04-30

## 2024-06-11 ENCOUNTER — OFFICE VISIT (OUTPATIENT)
Facility: CLINIC | Age: 59
End: 2024-06-11
Payer: MEDICARE

## 2024-06-11 VITALS
DIASTOLIC BLOOD PRESSURE: 82 MMHG | SYSTOLIC BLOOD PRESSURE: 134 MMHG | TEMPERATURE: 98.2 F | OXYGEN SATURATION: 99 % | HEIGHT: 70 IN | RESPIRATION RATE: 18 BRPM | HEART RATE: 94 BPM | BODY MASS INDEX: 23.77 KG/M2 | WEIGHT: 166 LBS

## 2024-06-11 DIAGNOSIS — R74.01 ELEVATED AST (SGOT): ICD-10-CM

## 2024-06-11 DIAGNOSIS — R63.4 WEIGHT LOSS: Primary | ICD-10-CM

## 2024-06-11 DIAGNOSIS — Z12.11 ENCOUNTER FOR SCREENING FOR MALIGNANT NEOPLASM OF COLON: ICD-10-CM

## 2024-06-11 DIAGNOSIS — E78.5 HYPERLIPIDEMIA, UNSPECIFIED HYPERLIPIDEMIA TYPE: ICD-10-CM

## 2024-06-11 DIAGNOSIS — Z11.4 SCREENING FOR HIV WITHOUT PRESENCE OF RISK FACTORS: ICD-10-CM

## 2024-06-11 DIAGNOSIS — F20.89 OTHER SCHIZOPHRENIA (HCC): ICD-10-CM

## 2024-06-11 DIAGNOSIS — F10.20 ALCOHOL DEPENDENCE, UNCOMPLICATED (HCC): ICD-10-CM

## 2024-06-11 DIAGNOSIS — Z13.1 ENCOUNTER FOR SCREENING FOR DIABETES MELLITUS: ICD-10-CM

## 2024-06-11 DIAGNOSIS — I10 ESSENTIAL HYPERTENSION: ICD-10-CM

## 2024-06-11 LAB — HBA1C MFR BLD: 4.7 %

## 2024-06-11 PROCEDURE — 1036F TOBACCO NON-USER: CPT | Performed by: STUDENT IN AN ORGANIZED HEALTH CARE EDUCATION/TRAINING PROGRAM

## 2024-06-11 PROCEDURE — 3079F DIAST BP 80-89 MM HG: CPT | Performed by: STUDENT IN AN ORGANIZED HEALTH CARE EDUCATION/TRAINING PROGRAM

## 2024-06-11 PROCEDURE — 83036 HEMOGLOBIN GLYCOSYLATED A1C: CPT | Performed by: STUDENT IN AN ORGANIZED HEALTH CARE EDUCATION/TRAINING PROGRAM

## 2024-06-11 PROCEDURE — 99214 OFFICE O/P EST MOD 30 MIN: CPT | Performed by: STUDENT IN AN ORGANIZED HEALTH CARE EDUCATION/TRAINING PROGRAM

## 2024-06-11 PROCEDURE — G8420 CALC BMI NORM PARAMETERS: HCPCS | Performed by: STUDENT IN AN ORGANIZED HEALTH CARE EDUCATION/TRAINING PROGRAM

## 2024-06-11 PROCEDURE — 3075F SYST BP GE 130 - 139MM HG: CPT | Performed by: STUDENT IN AN ORGANIZED HEALTH CARE EDUCATION/TRAINING PROGRAM

## 2024-06-11 PROCEDURE — G8427 DOCREV CUR MEDS BY ELIG CLIN: HCPCS | Performed by: STUDENT IN AN ORGANIZED HEALTH CARE EDUCATION/TRAINING PROGRAM

## 2024-06-11 PROCEDURE — 3017F COLORECTAL CA SCREEN DOC REV: CPT | Performed by: STUDENT IN AN ORGANIZED HEALTH CARE EDUCATION/TRAINING PROGRAM

## 2024-06-11 RX ORDER — LANOLIN ALCOHOL/MO/W.PET/CERES
100 CREAM (GRAM) TOPICAL DAILY
Qty: 30 TABLET | Refills: 3 | Status: SHIPPED | OUTPATIENT
Start: 2024-06-11

## 2024-06-11 RX ORDER — FOLIC ACID 1 MG/1
1 TABLET ORAL DAILY
Qty: 90 TABLET | Refills: 1 | Status: SHIPPED | OUTPATIENT
Start: 2024-06-11

## 2024-06-11 ASSESSMENT — PATIENT HEALTH QUESTIONNAIRE - PHQ9
SUM OF ALL RESPONSES TO PHQ QUESTIONS 1-9: 4
3. TROUBLE FALLING OR STAYING ASLEEP: SEVERAL DAYS
9. THOUGHTS THAT YOU WOULD BE BETTER OFF DEAD, OR OF HURTING YOURSELF: NOT AT ALL
SUM OF ALL RESPONSES TO PHQ QUESTIONS 1-9: 4
SUM OF ALL RESPONSES TO PHQ QUESTIONS 1-9: 4
7. TROUBLE CONCENTRATING ON THINGS, SUCH AS READING THE NEWSPAPER OR WATCHING TELEVISION: SEVERAL DAYS
SUM OF ALL RESPONSES TO PHQ9 QUESTIONS 1 & 2: 1
5. POOR APPETITE OR OVEREATING: NOT AT ALL
6. FEELING BAD ABOUT YOURSELF - OR THAT YOU ARE A FAILURE OR HAVE LET YOURSELF OR YOUR FAMILY DOWN: SEVERAL DAYS
SUM OF ALL RESPONSES TO PHQ QUESTIONS 1-9: 4
1. LITTLE INTEREST OR PLEASURE IN DOING THINGS: NOT AT ALL
8. MOVING OR SPEAKING SO SLOWLY THAT OTHER PEOPLE COULD HAVE NOTICED. OR THE OPPOSITE, BEING SO FIGETY OR RESTLESS THAT YOU HAVE BEEN MOVING AROUND A LOT MORE THAN USUAL: NOT AT ALL
4. FEELING TIRED OR HAVING LITTLE ENERGY: NOT AT ALL
10. IF YOU CHECKED OFF ANY PROBLEMS, HOW DIFFICULT HAVE THESE PROBLEMS MADE IT FOR YOU TO DO YOUR WORK, TAKE CARE OF THINGS AT HOME, OR GET ALONG WITH OTHER PEOPLE: NOT DIFFICULT AT ALL
2. FEELING DOWN, DEPRESSED OR HOPELESS: SEVERAL DAYS

## 2024-06-11 NOTE — PROGRESS NOTES
Chief Complaint   Patient presents with    Weight Loss     Reports rapid weight loss, x2 months duration.    Follow-up Chronic Condition         \"Have you been to the ER, urgent care clinic since your last visit?  Hospitalized since your last visit?\"    NO    “Have you seen or consulted any other health care providers outside of [facility] since your last visit?”    NO        “Have you had a colorectal cancer screening such as a colonoscopy/FIT/Cologuard?    NO    No colonoscopy on file  No cologuard on file  No FIT/FOBT on file   No flexible sigmoidoscopy on file         Click Here for Release of Records Request     Health Maintenance Due   Topic Date Due    Hepatitis B vaccine (1 of 3 - 3-dose series) Never done    HIV screen  Never done    Hepatitis C screen  Never done    Colorectal Cancer Screen  Never done    Shingles vaccine (1 of 2) Never done    COVID-19 Vaccine (3 - 2023-24 season) 09/01/2023    Depression Monitoring  02/14/2024      
I saw and evaluated the patient, performing the key elements of the service.  I discussed the findings, assessment and plan with the resident and agree with the resident's findings and plan as documented in the resident's note.   
He notes that his appetite is somewhat reduced, but he has been eating similar to prior.  He denies blood in his stools, melena, nausea, vomiting, abdominal pain.  No record of prior colonoscopy.  He reports that his mother had a similar episode of weight loss if he was diagnosed with hyperthyroidism.  Exam reassuring, VSS.  POC A1c 4.7.  Recommended serologic workup including CBC, CMP, TSH, free T4.  Recommended referral to gastroenterology to consider colonoscopy.  Patient stated that he would like to be referred.  Provided referral.  Provided patient with contact information for gastroenterology in [place].  Recommended supplementation with meals (e.g. boost or Ensure).  Recommended close follow-up in 1 month.  -     TSH + Free T4 Panel; Future  -     AMB POC HEMOGLOBIN A1C  -     COLLECTION CAPILLARY BLOOD SPECIMEN  -     CBC; Future  -     Comprehensive Metabolic Panel; Future  -     External Referral To Gastroenterology    Essential hypertension: Chronic.  Borderline controlled based on office reading today (130/82) on amlodipine 10 mg daily and HCTZ 25 mg daily.  Recommended rechecking electrolytes and kidney function today.    Hyperlipidemia, unspecified hyperlipidemia type: Last LDL 56.6 on 11/21/2023.  Patient prescribed atorvastatin 80 mg daily.    Other schizophrenia (HCC): Chronic.  Followed with [facility] prior.  Reports occasional visual hallucinations described as \"shadows.\"  Denies recent symptoms.  PHQ-9 score 4.  Denies SI.  Reports taking risperidone, olanzapine, and sertraline as prescribed.    Alcohol dependence, uncomplicated (HCC): Patient states that he drinks occasionally on the weekends.  AST elevated prior.  Recommended rechecking CMP, liver ultrasound.  Recommend folate and thiamine supplementation.    Elevated AST (SGOT): Demonstrated on prior lab work.  Recommended obtaining acute hepatitis panel, ferritin, liver US.  Provided patient with information to schedule liver US at his

## 2024-06-12 LAB
-: NORMAL
ALBUMIN SERPL-MCNC: 3.7 G/DL (ref 3.5–5)
ALBUMIN/GLOB SERPL: 1.1 (ref 1.1–2.2)
ALP SERPL-CCNC: 243 U/L (ref 45–117)
ALT SERPL-CCNC: 138 U/L (ref 12–78)
ANION GAP SERPL CALC-SCNC: 10 MMOL/L (ref 5–15)
AST SERPL-CCNC: 322 U/L (ref 15–37)
BILIRUB SERPL-MCNC: 0.9 MG/DL (ref 0.2–1)
BUN SERPL-MCNC: 10 MG/DL (ref 6–20)
BUN/CREAT SERPL: 9 (ref 12–20)
CALCIUM SERPL-MCNC: 8.7 MG/DL (ref 8.5–10.1)
CHLORIDE SERPL-SCNC: 101 MMOL/L (ref 97–108)
CO2 SERPL-SCNC: 24 MMOL/L (ref 21–32)
CREAT SERPL-MCNC: 1.1 MG/DL (ref 0.7–1.3)
ERYTHROCYTE [DISTWIDTH] IN BLOOD BY AUTOMATED COUNT: 14.3 % (ref 11.5–14.5)
FERRITIN SERPL-MCNC: 916 NG/ML (ref 26–388)
GLOBULIN SER CALC-MCNC: 3.5 G/DL (ref 2–4)
GLUCOSE SERPL-MCNC: 160 MG/DL (ref 65–100)
HAV IGM SER QL: NONREACTIVE
HBV CORE IGM SER QL: NONREACTIVE
HBV SURFACE AG SER QL: <0.1 INDEX
HBV SURFACE AG SER QL: NEGATIVE
HCT VFR BLD AUTO: 43.7 % (ref 36.6–50.3)
HCV AB SER IA-ACNC: <0.02 INDEX
HCV AB SERPL QL IA: NONREACTIVE
HGB BLD-MCNC: 15.2 G/DL (ref 12.1–17)
HIV 1+2 AB+HIV1 P24 AG SERPL QL IA: NONREACTIVE
HIV 1/2 RESULT COMMENT: NORMAL
MCH RBC QN AUTO: 32.3 PG (ref 26–34)
MCHC RBC AUTO-ENTMCNC: 34.8 G/DL (ref 30–36.5)
MCV RBC AUTO: 92.8 FL (ref 80–99)
NRBC # BLD: 0 K/UL (ref 0–0.01)
NRBC BLD-RTO: 0 PER 100 WBC
PLATELET # BLD AUTO: 286 K/UL (ref 150–400)
PMV BLD AUTO: 9.8 FL (ref 8.9–12.9)
POTASSIUM SERPL-SCNC: 3.7 MMOL/L (ref 3.5–5.1)
PROT SERPL-MCNC: 7.2 G/DL (ref 6.4–8.2)
RBC # BLD AUTO: 4.71 M/UL (ref 4.1–5.7)
SODIUM SERPL-SCNC: 135 MMOL/L (ref 136–145)
T4 FREE SERPL-MCNC: 0.8 NG/DL (ref 0.8–1.5)
TSH SERPL DL<=0.05 MIU/L-ACNC: 1.66 UIU/ML (ref 0.36–3.74)
WBC # BLD AUTO: 2.7 K/UL (ref 4.1–11.1)

## 2024-06-13 ENCOUNTER — TELEPHONE (OUTPATIENT)
Facility: CLINIC | Age: 59
End: 2024-06-13

## 2024-06-13 DIAGNOSIS — R79.89 ELEVATED LFTS: ICD-10-CM

## 2024-06-13 DIAGNOSIS — D72.819 LEUKOPENIA, UNSPECIFIED TYPE: Primary | ICD-10-CM

## 2024-06-13 DIAGNOSIS — D57.3 SICKLE CELL TRAIT (HCC): ICD-10-CM

## 2024-06-13 DIAGNOSIS — R79.89 ELEVATED FERRITIN: ICD-10-CM

## 2024-06-13 DIAGNOSIS — E78.1 HIGH BLOOD TRIGLYCERIDES: ICD-10-CM

## 2024-06-13 DIAGNOSIS — R74.8 ELEVATED ALKALINE PHOSPHATASE LEVEL: ICD-10-CM

## 2024-06-13 NOTE — TELEPHONE ENCOUNTER
Reviewed lab results:  - WBC 2.7.  - Sodium 135 (corrected 136).  - Glucose 160.  POC A1c 4.7.  - Creatinine 1.10 (previously 1.32).  eGFR 78.  - , , alkaline phophatase 243.  Specimen lipemic (results verified by dilution).  Elevations in AST, ALT, and lipids can be caused by pt's current regimen of Zyprexa and Risperdal.  Acute hepatitis panel negative.  DDX also includes hereditary hemochromatosis (ferritin elevated), SWANN, Sarmad's disease, hepatocellular growth.  - Ferritin 916 (elevated).  DDX includes hemochromatosis, liver disease, chronic inflammation/malignancy.  - HIV screen negative.  - Acute hepatitis panel negative.  - TSH and free T4 WNL.    Called patient and discussed lab results.    Leukopenia: Uncertain etiology.  DDx includes medication side effect (Risperdal and olanzapine can be associated with leukopenia), nutrient deficiency, chronic inflammation, other hematologic process.  Recommended obtaining additional blood test including repeat CBC, vitamin B12 level, folate, peripheral smear, JAMESON.    Elevated transaminases/alkaline phosphatase: No reported abdominal pain at LOV.  Acute hepatitis panel negative.  DDx includes medication effect (Zyprexa and Risperdal), hereditary hemochromatosis (ferritin elevated), Swann, Sarmad's disease, hepatocellular growth.  Recommended obtaining repeat CMP, GGT, RUQ US (previously ordered), fasting lipid panel, JAMESON.    Elevated ferritin level: 916.  DDx includes hemochromatosis, liver disease, chronic inflammation/malignancy.  Recommended obtaining HFE gene analysis, JAMESON, peripheral smear.    Discussed negative HIV screening test result.    TSH and free T4 WNL.    Schizophrenia: Currently prescribed Risperdal, olanzapine, Zoloft.  Concerned that antipsychotic regimen may be contributing to laboratory abnormalities as per above.  Recommended obtaining repeat lab values as per above and follow-up with psychiatry at [facility] to consider

## 2024-06-13 NOTE — RESULT ENCOUNTER NOTE
Reviewed lab results:  - WBC 2.7.  - Sodium 135 (corrected 136).  - Glucose 160.  POC A1c 4.7.  - Creatinine 1.10 (previously 1.32).  eGFR 78.  - , , alkaline phophatase 243.  Specimen lipemic (results verified by dilution).  Elevations in AST, ALT, and lipids can be caused by pt's current regimen of Zyprexa and Risperdal.  Acute hepatitis panel negative.  DDX also includes hereditary hemochromatosis (ferritin elevated), SWANN, Sarmad's disease, hepatocellular growth.  - Ferritin 916 (elevated).  DDX includes hemochromatosis, liver disease, chronic inflammation/malignancy.  - HIV screen negative.  - Acute hepatitis panel negative.  - TSH and free T4 WNL.    - Recommend ceruloplasmin, 24-hour urine copper, serum copper, HFE gene analysis, GGT, RUQ US (previously ordered), fasting lipid panel, peripheral smear, repeat CBC, peripheral smear, JAMESON, B12, folate.  - Will likely need adjustment of regimen for schizophrenia.  Will request making an appointment with [facility] once further lab work obtained.

## 2024-06-14 DIAGNOSIS — R79.89 ELEVATED FERRITIN: ICD-10-CM

## 2024-06-14 DIAGNOSIS — R74.8 ELEVATED ALKALINE PHOSPHATASE LEVEL: ICD-10-CM

## 2024-06-14 DIAGNOSIS — D72.819 LEUKOPENIA, UNSPECIFIED TYPE: ICD-10-CM

## 2024-06-14 DIAGNOSIS — E78.1 HIGH BLOOD TRIGLYCERIDES: ICD-10-CM

## 2024-06-14 DIAGNOSIS — R79.89 ELEVATED LFTS: ICD-10-CM

## 2024-06-14 NOTE — TELEPHONE ENCOUNTER
Called [facility] regarding patient and lab results. Patient is no longer under their services, however with recent labs concerning would like to discuss.    No answer. Message left for return call.

## 2024-06-15 LAB
ALBUMIN SERPL-MCNC: 3.9 G/DL (ref 3.5–5)
ALBUMIN/GLOB SERPL: 1 (ref 1.1–2.2)
ALP SERPL-CCNC: 218 U/L (ref 45–117)
ALT SERPL-CCNC: 75 U/L (ref 12–78)
ANION GAP SERPL CALC-SCNC: 8 MMOL/L (ref 5–15)
AST SERPL-CCNC: 101 U/L (ref 15–37)
BASOPHILS # BLD: 0 K/UL (ref 0–0.1)
BASOPHILS NFR BLD: 0 % (ref 0–1)
BILIRUB SERPL-MCNC: 2 MG/DL (ref 0.2–1)
BUN SERPL-MCNC: 8 MG/DL (ref 6–20)
BUN/CREAT SERPL: 6 (ref 12–20)
CALCIUM SERPL-MCNC: 9.7 MG/DL (ref 8.5–10.1)
CHLORIDE SERPL-SCNC: 91 MMOL/L (ref 97–108)
CHOLEST SERPL-MCNC: 185 MG/DL
CO2 SERPL-SCNC: 29 MMOL/L (ref 21–32)
CREAT SERPL-MCNC: 1.45 MG/DL (ref 0.7–1.3)
DIFFERENTIAL METHOD BLD: NORMAL
EOSINOPHIL # BLD: 0.3 K/UL (ref 0–0.4)
EOSINOPHIL NFR BLD: 5 % (ref 0–7)
ERYTHROCYTE [DISTWIDTH] IN BLOOD BY AUTOMATED COUNT: 14.1 % (ref 11.5–14.5)
FOLATE SERPL-MCNC: 25.6 NG/ML (ref 5–21)
GGT SERPL-CCNC: 1277 U/L (ref 15–85)
GLOBULIN SER CALC-MCNC: 3.9 G/DL (ref 2–4)
GLUCOSE SERPL-MCNC: 118 MG/DL (ref 65–100)
HCT VFR BLD AUTO: 46.7 % (ref 36.6–50.3)
HDLC SERPL-MCNC: 67 MG/DL
HDLC SERPL: 2.8 (ref 0–5)
HGB BLD-MCNC: 15.4 G/DL (ref 12.1–17)
IMM GRANULOCYTES # BLD AUTO: 0 K/UL (ref 0–0.04)
IMM GRANULOCYTES NFR BLD AUTO: 0 % (ref 0–0.5)
LDLC SERPL CALC-MCNC: 65.2 MG/DL (ref 0–100)
LYMPHOCYTES # BLD: 1.2 K/UL (ref 0.8–3.5)
LYMPHOCYTES NFR BLD: 22 % (ref 12–49)
MCH RBC QN AUTO: 30.6 PG (ref 26–34)
MCHC RBC AUTO-ENTMCNC: 33 G/DL (ref 30–36.5)
MCV RBC AUTO: 92.8 FL (ref 80–99)
MONOCYTES # BLD: 0.7 K/UL (ref 0–1)
MONOCYTES NFR BLD: 12 % (ref 5–13)
NEUTS SEG # BLD: 3.4 K/UL (ref 1.8–8)
NEUTS SEG NFR BLD: 61 % (ref 32–75)
NRBC # BLD: 0 K/UL (ref 0–0.01)
NRBC BLD-RTO: 0 PER 100 WBC
PERIPHERAL SMEAR, MD REVIEW: NORMAL
PLATELET # BLD AUTO: 283 K/UL (ref 150–400)
PMV BLD AUTO: 9.6 FL (ref 8.9–12.9)
POTASSIUM SERPL-SCNC: 3.3 MMOL/L (ref 3.5–5.1)
PROT SERPL-MCNC: 7.8 G/DL (ref 6.4–8.2)
RBC # BLD AUTO: 5.03 M/UL (ref 4.1–5.7)
SODIUM SERPL-SCNC: 128 MMOL/L (ref 136–145)
TRIGL SERPL-MCNC: 264 MG/DL
VIT B12 SERPL-MCNC: 499 PG/ML (ref 193–986)
VLDLC SERPL CALC-MCNC: 52.8 MG/DL
WBC # BLD AUTO: 5.6 K/UL (ref 4.1–11.1)

## 2024-06-16 LAB — CERULOPLASMIN SERPL-MCNC: 24.4 MG/DL (ref 16–31)

## 2024-06-17 ENCOUNTER — TELEPHONE (OUTPATIENT)
Facility: CLINIC | Age: 59
End: 2024-06-17

## 2024-06-17 DIAGNOSIS — R79.89 ELEVATED LFTS: ICD-10-CM

## 2024-06-17 DIAGNOSIS — E87.1 HYPONATREMIA: Primary | ICD-10-CM

## 2024-06-17 LAB
ANA SER QL: NEGATIVE
COPPER SERPL-MCNC: 87 UG/DL (ref 69–132)

## 2024-06-17 NOTE — RESULT ENCOUNTER NOTE
Reviewed lab results collected on 6/14/24:  - Vitamin B12 WNL, folate c/w supplementation.  - Tchol 185, trig 264, HDL 67, LDL 65.2.  - Sodium 128, K 3.3, glucose 118, creatinine 1.45 (eGFR 56).  Creatinine previously 1.10, 1.32.    - Total bilirubin 2.0.  - , ALT WNL.  - Alk phos 218.  - GGT 1277.  - JAMESON negative.  - Ceruloplasmin WNL.  - CBC WNL.  - Peripheral smear demonstrates leukocytes with mild left shift.  Erythrocytes and platelets within normal limits.

## 2024-06-18 DIAGNOSIS — E87.1 HYPONATREMIA: ICD-10-CM

## 2024-06-18 NOTE — TELEPHONE ENCOUNTER
Reviewed lab results collected on 6/14/24:  - Vitamin B12 WNL, folate c/w supplementation.  - Tchol 185, trig 264, HDL 67, LDL 65.2.  - Sodium 128, K 3.3, glucose 118, creatinine 1.45 (eGFR 56).  Creatinine previously 1.10, 1.32.    - Total bilirubin 2.0.  - , ALT WNL.  - Alk phos 218.  - GGT 1277.  - JAMESON negative.  - Ceruloplasmin WNL.  - CBC WNL.  - Peripheral smear demonstrates leukocytes with mild left shift.  Erythrocytes and platelets within normal limits.  - Copper level WNL.    Called patient and discussed results.    He reports some pain in his toe that feels like gout pain.  He has no other complaints.  He denies nausea, vomiting, abdominal pain, dizziness.    Weight loss: Uncertain etiology.  Encouraged supplementation with Boost / Ensure.  Previously provided referral to gastroenterology.  Recommended re-checking CMP.  Patient states that he is scheduled to see [facility] Thursday.    Elevated creatinine: Fluctuating baseline.  Possibly due to decreased PO intake.  Recommend re-checking CMP.  Patient plans to come 6/18/24 AM.    Hyponatremia: Uncertain etiology.  Possibly due to decreased solute intake.  Recommended re-checking CMP.    Elevated GGT / AST: Recommended checking RUQ US (order previously placed).  Provided referral to gastroenterology.  Follow-up result of serum ferritin.    Plan for in-office follow-up on 7/9/24.

## 2024-06-19 ENCOUNTER — TELEPHONE (OUTPATIENT)
Facility: CLINIC | Age: 59
End: 2024-06-19

## 2024-06-19 LAB
ALBUMIN SERPL-MCNC: 3.6 G/DL (ref 3.5–5)
ALBUMIN/GLOB SERPL: 0.9 (ref 1.1–2.2)
ALP SERPL-CCNC: 185 U/L (ref 45–117)
ALT SERPL-CCNC: 49 U/L (ref 12–78)
ANION GAP SERPL CALC-SCNC: 11 MMOL/L (ref 5–15)
AST SERPL-CCNC: 68 U/L (ref 15–37)
BILIRUB SERPL-MCNC: 0.9 MG/DL (ref 0.2–1)
BUN SERPL-MCNC: 6 MG/DL (ref 6–20)
BUN/CREAT SERPL: 5 (ref 12–20)
CALCIUM SERPL-MCNC: 9.6 MG/DL (ref 8.5–10.1)
CHLORIDE SERPL-SCNC: 87 MMOL/L (ref 97–108)
CO2 SERPL-SCNC: 32 MMOL/L (ref 21–32)
CREAT SERPL-MCNC: 1.27 MG/DL (ref 0.7–1.3)
GLOBULIN SER CALC-MCNC: 4.1 G/DL (ref 2–4)
GLUCOSE SERPL-MCNC: 122 MG/DL (ref 65–100)
POTASSIUM SERPL-SCNC: 2.6 MMOL/L (ref 3.5–5.1)
PROT SERPL-MCNC: 7.7 G/DL (ref 6.4–8.2)
SODIUM SERPL-SCNC: 130 MMOL/L (ref 136–145)

## 2024-06-19 NOTE — TELEPHONE ENCOUNTER
Reviewed results of CMP:  - Sodium 130.  - K 2.6 (critically low).  - Chloride 87.  - Glucose 122.  - Creatinine 1.27 (previously 1.45).  eGFR 66.  - AST 68 (improved from 101, 322).    Severe hypokalemia: Called patient and discussed critically low potassium level.  He reports dizziness and generalized weakness.  Recommended patient call 911 to be transported to the nearest emergency room immediately.  Discussed risks of critically low potassium including cardiac arrhythmias and death.  Patient states \"I can drive there.\"  I expressed concern for him driving given his lab abnormalities and symptoms.  He states that he plans to go to the [place] ED.  Called [place] [facility] and gave report.

## 2024-06-19 NOTE — RESULT ENCOUNTER NOTE
Reviewed results of CMP:  - Sodium 130.  - K 2.6 (critically low).  - Chloride 87.  - Glucose 122.  - Creatinine 1.27 (previously 1.45).  eGFR 66.  - AST 68 (improved from 101, 322).  - Please see phone note.

## 2024-06-21 ENCOUNTER — TELEPHONE (OUTPATIENT)
Facility: CLINIC | Age: 59
End: 2024-06-21

## 2024-06-21 ENCOUNTER — OFFICE VISIT (OUTPATIENT)
Facility: CLINIC | Age: 59
End: 2024-06-21

## 2024-06-21 VITALS
SYSTOLIC BLOOD PRESSURE: 100 MMHG | OXYGEN SATURATION: 98 % | TEMPERATURE: 98 F | BODY MASS INDEX: 23.77 KG/M2 | WEIGHT: 166 LBS | DIASTOLIC BLOOD PRESSURE: 68 MMHG | RESPIRATION RATE: 18 BRPM | HEIGHT: 70 IN | HEART RATE: 84 BPM

## 2024-06-21 DIAGNOSIS — I10 ESSENTIAL HYPERTENSION: ICD-10-CM

## 2024-06-21 DIAGNOSIS — R79.89 ELEVATED LFTS: ICD-10-CM

## 2024-06-21 DIAGNOSIS — R63.4 WEIGHT LOSS: ICD-10-CM

## 2024-06-21 DIAGNOSIS — R79.89 ELEVATED FERRITIN: ICD-10-CM

## 2024-06-21 DIAGNOSIS — D57.3 SICKLE CELL TRAIT (HCC): ICD-10-CM

## 2024-06-21 DIAGNOSIS — Z09 ENCOUNTER FOR EXAMINATION FOLLOWING TREATMENT AT HOSPITAL: ICD-10-CM

## 2024-06-21 DIAGNOSIS — E87.6 HYPOKALEMIA: Primary | ICD-10-CM

## 2024-06-21 LAB
COLLECT DURATION TIME UR: 24 HR
COPPER 24H UR-MRATE: 2 UG/24 HR (ref 3–35)
COPPER UR-MCNC: 6 UG/L
COPPER/CREAT UR: 10 UG/G CREAT (ref 0–49)
CREAT UR-MCNC: 0.62 G/L (ref 0.3–3)
SPECIMEN VOL ?TM UR: ABNORMAL ML

## 2024-06-21 NOTE — PROGRESS NOTES
Chief Complaint   Patient presents with    Follow-up     [facility] 6/20/24 Hypokalemia         \"Have you been to the ER, urgent care clinic since your last visit?  Hospitalized since your last visit?\"    YES- [facility]. SEE ENCOUNTER.    “Have you seen or consulted any other health care providers outside of [facility] since your last visit?”    NO        “Have you had a colorectal cancer screening such as a colonoscopy/FIT/Cologuard?    NO    No colonoscopy on file  No cologuard on file  No FIT/FOBT on file   No flexible sigmoidoscopy on file         Click Here for Release of Records Request     Health Maintenance Due   Topic Date Due    Hepatitis B vaccine (1 of 3 - 3-dose series) Never done    Colorectal Cancer Screen  Never done    Shingles vaccine (1 of 2) Never done    COVID-19 Vaccine (3 - 2023-24 season) 09/01/2023    Annual Wellness Visit (Medicare Advantage)  Never done      
I reviewed with the resident the medical history and the resident's findings on the physical examination.  I discussed with the resident the patient's diagnosis and concur with the plan.    
2.0.  Patient received IV and p.o. repletion.  Recommended rechecking potassium today.  Recommended stopping HCTZ (by phone following visit) as this could be contributing to hypokalemia.  Blood pressure slightly hypotensive (100/68 today).  Recommended continuing amlodipine.  Recommended close follow-up in about 2 weeks to recheck blood pressure.  -     Comprehensive Metabolic Panel; Future    Essential hypertension: Per above.    Encounter for examination following treatment at hospital: Per above.    Sickle cell trait (HCC): Per history.    Weight loss: Patient has experienced an approximately 20 pound weight loss since November of last year.  He reports reduced appetite.  He states that he eats a bite or 2 of food, but then it does not look appetizing.  Previously denied nausea, vomiting, abdominal pain, hematochezia, melena.  Serologic workup notable for elevated transaminase levels, elevated ferritin, elevated GGT, negative acute hepatitis panel.  Previously referred to gastroenterology (pref[facility]).  Also previously recommended RUQ US (prefers [facility]).  Recommended follow-up with [facility] to consider adjustment of regimen.  Recommended close follow-up as per above.    Elevated LFTs: Per above.  -     Comprehensive Metabolic Panel; Future    Elevated ferritin: Per above.  Follow-up HFE gene analysis.        Return in about 4 weeks (around 7/19/2024) for follow-up weight loss.    Raul Gates expressed understanding of this plan. An AVS was printed and given to the patient.     Pt discussed with Dr. Machado (Attending Physician),    Walter Medina MD  Family Medicine Resident    Please note that this dictation was completed with [email or web address], the [email or web address] voice recognition software.  Quite often unanticipated grammatical, syntax, homophones, and other interpretive errors are inadvertently transcribed by the computer software. Please disregard these errors.  Please excuse any errors that have escaped

## 2024-06-21 NOTE — TELEPHONE ENCOUNTER
Blood pressure relatively hypotensive today (100/68) on amlodipine 10 mg daily and hydrochlorothiazide 25 mg daily.      Called patient after visit and recommended stopping hydrochlorothiazide as this could be contributing to his hypokalemia.  He acknowledged understanding.  Recommended close follow-up as scheduled on 7/9/2024 with Dr. Elizondo.  Could consider ACE inhibitor or ARB if pressure above goal on follow-up.

## 2024-06-22 ENCOUNTER — TELEPHONE (OUTPATIENT)
Facility: CLINIC | Age: 59
End: 2024-06-22

## 2024-06-22 DIAGNOSIS — E87.6 LOW BLOOD POTASSIUM: Primary | ICD-10-CM

## 2024-06-22 LAB
ALBUMIN SERPL-MCNC: 3.6 G/DL (ref 3.5–5)
ALBUMIN/GLOB SERPL: 0.9 (ref 1.1–2.2)
ALP SERPL-CCNC: 175 U/L (ref 45–117)
ALT SERPL-CCNC: 42 U/L (ref 12–78)
ANION GAP SERPL CALC-SCNC: 8 MMOL/L (ref 5–15)
AST SERPL-CCNC: 57 U/L (ref 15–37)
BILIRUB SERPL-MCNC: 0.6 MG/DL (ref 0.2–1)
BUN SERPL-MCNC: 5 MG/DL (ref 6–20)
BUN/CREAT SERPL: 5 (ref 12–20)
CALCIUM SERPL-MCNC: 9.8 MG/DL (ref 8.5–10.1)
CHLORIDE SERPL-SCNC: 90 MMOL/L (ref 97–108)
CO2 SERPL-SCNC: 33 MMOL/L (ref 21–32)
CREAT SERPL-MCNC: 1.08 MG/DL (ref 0.7–1.3)
GLOBULIN SER CALC-MCNC: 3.8 G/DL (ref 2–4)
GLUCOSE SERPL-MCNC: 119 MG/DL (ref 65–100)
POTASSIUM SERPL-SCNC: 3.2 MMOL/L (ref 3.5–5.1)
PROT SERPL-MCNC: 7.4 G/DL (ref 6.4–8.2)
SODIUM SERPL-SCNC: 131 MMOL/L (ref 136–145)

## 2024-06-22 RX ORDER — POTASSIUM CHLORIDE 750 MG/1
10 TABLET, EXTENDED RELEASE ORAL DAILY
Qty: 30 TABLET | Refills: 0 | Status: SHIPPED | OUTPATIENT
Start: 2024-06-22

## 2024-06-24 NOTE — TELEPHONE ENCOUNTER
Reviewed repeat CMP:  - Sodium 131 (previously 130).  - Potassium 3.2 (improved from 2.6).  CO2 33.  Glucose 119.  Chloride 90.  Alkaline phosphatase remains mildly elevated (175).  - Creatinine 1.08 (at baseline).  - ALT WNL.  - AST 57 (improved from 68).  Follow-up HFE gene analysis and RUQ US (previously ordered).  Referred to gastroenterology on 6/11/2024.    Called patient and discussed results of improved repeat potassium.  Previously recommended discontinuing hydrochlorothiazide.  Recommended potassium supplement (10 mill equivalents per day).  Sent prescription to pharmacy.

## 2024-07-01 ENCOUNTER — TELEPHONE (OUTPATIENT)
Facility: CLINIC | Age: 59
End: 2024-07-01

## 2024-07-03 NOTE — TELEPHONE ENCOUNTER
Pt states he went to [facility] in [place] and was told he did not have an appt, and that they did not have any information on him. Pt would like a call back today if possible. Please advise.

## 2024-07-08 LAB
HFE GENE MUT ANL BLD/T: NORMAL
REVIEWED BY: NORMAL

## 2024-07-09 ENCOUNTER — OFFICE VISIT (OUTPATIENT)
Facility: CLINIC | Age: 59
End: 2024-07-09
Payer: MEDICARE

## 2024-07-09 VITALS
RESPIRATION RATE: 18 BRPM | WEIGHT: 169.8 LBS | HEART RATE: 98 BPM | TEMPERATURE: 98.5 F | SYSTOLIC BLOOD PRESSURE: 134 MMHG | BODY MASS INDEX: 24.31 KG/M2 | OXYGEN SATURATION: 99 % | HEIGHT: 70 IN | DIASTOLIC BLOOD PRESSURE: 82 MMHG

## 2024-07-09 DIAGNOSIS — Z13.1 DIABETES MELLITUS SCREENING: ICD-10-CM

## 2024-07-09 DIAGNOSIS — R63.4 UNINTENTIONAL WEIGHT LOSS: Primary | ICD-10-CM

## 2024-07-09 PROCEDURE — G8428 CUR MEDS NOT DOCUMENT: HCPCS

## 2024-07-09 PROCEDURE — 3017F COLORECTAL CA SCREEN DOC REV: CPT

## 2024-07-09 PROCEDURE — G8420 CALC BMI NORM PARAMETERS: HCPCS

## 2024-07-09 PROCEDURE — 1036F TOBACCO NON-USER: CPT

## 2024-07-09 PROCEDURE — 99214 OFFICE O/P EST MOD 30 MIN: CPT

## 2024-07-09 PROCEDURE — 3079F DIAST BP 80-89 MM HG: CPT

## 2024-07-09 PROCEDURE — 3075F SYST BP GE 130 - 139MM HG: CPT

## 2024-07-09 NOTE — PROGRESS NOTES
I saw and evaluated the patient, performing the key elements of the service.  I discussed the findings, assessment and plan with the resident and agree with the resident's findings and plan as documented in the resident's note.    Unintentional weight loss: need to finish work up including: CXR, A1C, ESR/CRP.  Has appt with GI scheduled as he's never had a colonoscopy and has elevated AP with some transient elevations of other LFTs.  Highly suspect that's from his very high dose Risperdal for schizophrenia and bipolar.  He was dismissed from psych and we are faxing records and requesting they see him again.   With caution, will come down slightly on his Risperdal from 6mg bid to 4mg bid (this is still a high dose) due to suspected hepatotoxicity.  Close follow up needed - doesn't usually no show, seems reliable.  Continue other psych meds at current doses.

## 2024-07-09 NOTE — PATIENT INSTRUCTIONS
Decrease the amount of risperidone you are taking to 4mg twice a day.  Get an ensure or similar high calorie, high protein shake at take it three times a day.

## 2024-07-09 NOTE — PROGRESS NOTES
[address] [address]  Tel: [phone]     Chief Complaint   Patient presents with    Follow-up     Weight check     Follow-up  Reduced appetite and unintentional weight loss    History of Present Illness:  Raul Gates is a 58 y.o. male with a PMHx of HTN, HLD, bipolar disorder, schizophrenia, elevated liver enzymes  who presents to clinic for weight check    Patient previously seen on 6/21 with 21lb weight loss at the time after ER f/u for hypokalemia. Patient has lost his appetite, eating 1-2 bites of food then having no further desire to eat.    Sometimes having nausea in the morning. Has been having this since October 2023. Eats very little.     History reviewed. No pertinent past medical history.    Current Outpatient Medications   Medication Sig Dispense Refill    potassium chloride (KLOR-CON M) 10 MEQ extended release tablet Take 1 tablet by mouth daily 30 tablet 0    thiamine 100 MG tablet Take 1 tablet by mouth daily 30 tablet 3    folic acid (FOLVITE) 1 MG tablet Take 1 tablet by mouth daily 90 tablet 1    amLODIPine (NORVASC) 10 MG tablet TAKE ONE TABLET BY MOUTH EVERY DAY 90 tablet 1    OLANZapine (ZYPREXA) 15 MG tablet Take 1 tablet by mouth nightly 90 tablet 1    risperiDONE (RISPERDAL) 4 MG tablet Take 1 tablet by mouth 2 times daily 180 tablet 1    sertraline (ZOLOFT) 100 MG tablet Take 1 tablet by mouth daily 90 tablet 1    atorvastatin (LIPITOR) 80 MG tablet Take 1 tablet by mouth daily 90 tablet 1     No current facility-administered medications for this visit.       Allergies   Allergen Reactions    Penicillin G Swelling     Tongue swelling       Social History     Tobacco Use    Smoking status: Never    Smokeless tobacco: Never   Substance Use Topics    Alcohol use: Yes     Comment: occassionally    Drug use: No       Family History   Problem Relation Age of Onset    Heart Disease Mother     Thyroid Disease Mother          Review of Systems   Constitutional:

## 2024-07-09 NOTE — PROGRESS NOTES
\"Have you been to the ER, urgent care clinic since your last visit?  Hospitalized since your last visit?\"    NO    “Have you seen or consulted any other health care providers outside of [facility] since your last visit?”    NO        “Have you had a colorectal cancer screening such as a colonoscopy/FIT/Cologuard?    NO/appt scheduled for Sept       Click Here for Release of Records Request

## 2024-07-10 LAB
CRP SERPL-MCNC: <0.29 MG/DL (ref 0–0.3)
ERYTHROCYTE [SEDIMENTATION RATE] IN BLOOD: 3 MM/HR (ref 0–20)
EST. AVERAGE GLUCOSE BLD GHB EST-MCNC: 88 MG/DL
HBA1C MFR BLD: 4.7 % (ref 4–5.6)

## 2024-07-11 PROBLEM — R63.4 UNINTENTIONAL WEIGHT LOSS: Status: ACTIVE | Noted: 2024-07-11

## 2024-07-11 PROBLEM — Z13.1 DIABETES MELLITUS SCREENING: Status: ACTIVE | Noted: 2024-07-11

## 2024-07-11 PROBLEM — Z13.1 DIABETES MELLITUS SCREENING: Status: RESOLVED | Noted: 2024-07-11 | Resolved: 2024-07-11

## 2024-07-11 ASSESSMENT — ENCOUNTER SYMPTOMS
COUGH: 0
CONSTIPATION: 0
SHORTNESS OF BREATH: 0
ABDOMINAL DISTENTION: 0
VOMITING: 0
BLOOD IN STOOL: 0
ABDOMINAL PAIN: 0
NAUSEA: 0
DIARRHEA: 0

## 2024-07-11 NOTE — ASSESSMENT & PLAN NOTE
Patient presenting today with complaint of poor appetite since October 2023. Patient denies any change of medications at that time or change in bowel habits, diet, or emesis. Patient has lost 21lbs unintentionally since then, though he did experience a 3lb weight gain since his last visit. Patient has not been working out as much due to feeling like he is weaker than normal. Patient is currently on a very high dose of risperidone and his LFTs have been steadily rising, along with a notably high GGT recently. I am concerned that the patient may be experiencing some level of hepatotoxicity from his antipsychotic medications and this is causing him to lose his appetite. Will complete unintentional weight loss workup and reduce medication.  - Reduce risperidone to 4mg BID from 6mg BID  - A1c, ESR, CRP, CXR

## 2024-07-23 ENCOUNTER — OFFICE VISIT (OUTPATIENT)
Facility: CLINIC | Age: 59
End: 2024-07-23
Payer: MEDICARE

## 2024-07-23 VITALS
TEMPERATURE: 98.3 F | RESPIRATION RATE: 16 BRPM | HEART RATE: 101 BPM | WEIGHT: 167 LBS | BODY MASS INDEX: 23.91 KG/M2 | HEIGHT: 70 IN | DIASTOLIC BLOOD PRESSURE: 76 MMHG | SYSTOLIC BLOOD PRESSURE: 133 MMHG | OXYGEN SATURATION: 99 %

## 2024-07-23 DIAGNOSIS — R63.4 UNINTENTIONAL WEIGHT LOSS: Primary | ICD-10-CM

## 2024-07-23 DIAGNOSIS — F20.89 OTHER SCHIZOPHRENIA (HCC): ICD-10-CM

## 2024-07-23 DIAGNOSIS — F31.70 BIPOLAR DISORDER, CURRENTLY IN REMISSION, MOST RECENT EPISODE UNSPECIFIED (HCC): ICD-10-CM

## 2024-07-23 DIAGNOSIS — F31.9 BIPOLAR AFFECTIVE DISORDER, REMISSION STATUS UNSPECIFIED (HCC): ICD-10-CM

## 2024-07-23 DIAGNOSIS — F41.9 ANXIETY: ICD-10-CM

## 2024-07-23 PROCEDURE — 1036F TOBACCO NON-USER: CPT

## 2024-07-23 PROCEDURE — 3078F DIAST BP <80 MM HG: CPT

## 2024-07-23 PROCEDURE — 99214 OFFICE O/P EST MOD 30 MIN: CPT

## 2024-07-23 PROCEDURE — G8420 CALC BMI NORM PARAMETERS: HCPCS

## 2024-07-23 PROCEDURE — G8428 CUR MEDS NOT DOCUMENT: HCPCS

## 2024-07-23 PROCEDURE — 3017F COLORECTAL CA SCREEN DOC REV: CPT

## 2024-07-23 PROCEDURE — 3075F SYST BP GE 130 - 139MM HG: CPT

## 2024-07-23 RX ORDER — OLANZAPINE 15 MG/1
15 TABLET ORAL NIGHTLY
Qty: 90 TABLET | Refills: 1 | Status: SHIPPED | OUTPATIENT
Start: 2024-07-23 | End: 2025-01-19

## 2024-07-23 RX ORDER — RISPERIDONE 4 MG/1
4 TABLET ORAL 2 TIMES DAILY
Qty: 180 TABLET | Refills: 1 | Status: SHIPPED | OUTPATIENT
Start: 2024-07-23 | End: 2025-01-19

## 2024-07-23 RX ORDER — SERTRALINE HYDROCHLORIDE 100 MG/1
100 TABLET, FILM COATED ORAL DAILY
Qty: 90 TABLET | Refills: 1 | Status: SHIPPED | OUTPATIENT
Start: 2024-07-23 | End: 2025-01-19

## 2024-07-23 NOTE — PROGRESS NOTES
Reviewed record in preparation for visit and have necessary documentation  Pt did not bring medication to office visit for review  opportunity was given for questions  \"Have you been to the ER, urgent care clinic since your last visit?  Hospitalized since your last visit?\"    NO    “Have you seen or consulted any other health care providers outside of [facility] since your last visit?”    NO      Click Here for Release of Records Request     Goals that were addressed and/or need to be completed during or after this appointment include   Health Maintenance Due   Topic Date Due    Hepatitis B vaccine (1 of 3 - 3-dose series) Never done    Colorectal Cancer Screen  Never done    Shingles vaccine (1 of 2) Never done    COVID-19 Vaccine (3 - 2023-24 season) 09/01/2023    Annual Wellness Visit (Medicare Advantage)  Never done      
I reviewed with the resident the medical history and the resident's findings on the physical examination.  I discussed with the resident the patient's diagnosis and concur with the plan.      
medications  -Refilled medications, may need adjustment of medication in the near future           Return in about 1 month (around 8/23/2024) for Weight check.    Raul Gates expressed understanding of this plan. An AVS was printed and given to the patient.     Pt seen by and discussed with Dr. Castañeda,    Jean Elizondo MD  Family Medicine Resident

## 2024-07-24 PROBLEM — F31.70 BIPOLAR DISORDER IN FULL REMISSION (HCC): Status: ACTIVE | Noted: 2022-08-12

## 2024-07-24 LAB
ALBUMIN SERPL-MCNC: 4 G/DL (ref 3.5–5)
ALBUMIN/GLOB SERPL: 1.2 (ref 1.1–2.2)
ALP SERPL-CCNC: 266 U/L (ref 45–117)
ALT SERPL-CCNC: ABNORMAL U/L (ref 12–78)
ANION GAP SERPL CALC-SCNC: 10 MMOL/L (ref 5–15)
AST SERPL-CCNC: 191 U/L (ref 15–37)
BILIRUB SERPL-MCNC: 0.5 MG/DL (ref 0.2–1)
BUN SERPL-MCNC: 9 MG/DL (ref 6–20)
BUN/CREAT SERPL: 9 (ref 12–20)
CALCIUM SERPL-MCNC: 9.1 MG/DL (ref 8.5–10.1)
CHLORIDE SERPL-SCNC: 107 MMOL/L (ref 97–108)
CO2 SERPL-SCNC: 24 MMOL/L (ref 21–32)
CREAT SERPL-MCNC: 1.05 MG/DL (ref 0.7–1.3)
GLOBULIN SER CALC-MCNC: 3.3 G/DL (ref 2–4)
GLUCOSE SERPL-MCNC: 123 MG/DL (ref 65–100)
POTASSIUM SERPL-SCNC: 4 MMOL/L (ref 3.5–5.1)
PROT SERPL-MCNC: 7.3 G/DL (ref 6.4–8.2)
SODIUM SERPL-SCNC: 141 MMOL/L (ref 136–145)

## 2024-07-24 ASSESSMENT — ENCOUNTER SYMPTOMS
CONSTIPATION: 0
ANAL BLEEDING: 0
RECTAL PAIN: 0
VOMITING: 0
ABDOMINAL DISTENTION: 0
ABDOMINAL PAIN: 0
DIARRHEA: 0
SHORTNESS OF BREATH: 0
COUGH: 0
NAUSEA: 0
BLOOD IN STOOL: 0

## 2024-07-25 NOTE — ASSESSMENT & PLAN NOTE
Patient presenting today with complaint of continued poor appetite since last visit.  Despite decreasing risperidone from 6 mg twice daily to 4 mg twice daily, there has been no change in his appetite.  Considering history, there is a concern for possible gastroparesis, although motility agents are contraindicated in patients on antipsychotics as they may cause worsening hepatotoxicity, which appears to be part of this patient's current issue.  Overall I believe patient will need further reduction in his current antipsychotic and bipolar disorder medication regimen, although I am not certain that this is the root cause of his reduced appetite.  Weight is overall stable today, notable for possible 2 pound weight loss, although this may be the result of change in patient's clothing.  -Complete metabolic panel, will assess for further change in antipsychotic medications as indicated

## 2024-07-25 NOTE — ASSESSMENT & PLAN NOTE
Well-controlled, continue current medications.  Considering presentation today patient may need further reduction in dosing after evaluation of LFTs, will need to take extra care of that patient not have acute psychosis.

## 2024-07-25 NOTE — ASSESSMENT & PLAN NOTE
Well-controlled, continue current medications  -Refilled medications, may need adjustment of medication in the near future

## 2024-07-25 NOTE — ASSESSMENT & PLAN NOTE
Well-controlled, continue current medications.  -Refilled medication, may need adjustment for hepatotoxicity in the near future   Previously Negative (within the last year)

## 2024-07-30 DIAGNOSIS — R63.0 LOSS OF APPETITE: Primary | ICD-10-CM

## 2024-08-22 ENCOUNTER — OFFICE VISIT (OUTPATIENT)
Facility: CLINIC | Age: 59
End: 2024-08-22

## 2024-08-22 VITALS
BODY MASS INDEX: 24.34 KG/M2 | DIASTOLIC BLOOD PRESSURE: 81 MMHG | WEIGHT: 170 LBS | SYSTOLIC BLOOD PRESSURE: 134 MMHG | OXYGEN SATURATION: 99 % | HEIGHT: 70 IN | HEART RATE: 91 BPM | TEMPERATURE: 98.4 F | RESPIRATION RATE: 17 BRPM

## 2024-08-22 DIAGNOSIS — R63.4 UNINTENTIONAL WEIGHT LOSS: Primary | ICD-10-CM

## 2024-08-22 DIAGNOSIS — R63.0 LOSS OF APPETITE: ICD-10-CM

## 2024-08-22 ASSESSMENT — ENCOUNTER SYMPTOMS
BLOOD IN STOOL: 0
ABDOMINAL PAIN: 0
VOMITING: 0
DIARRHEA: 0
ABDOMINAL DISTENTION: 0
CONSTIPATION: 0
NAUSEA: 0
COUGH: 0
SHORTNESS OF BREATH: 0
RECTAL PAIN: 0
ANAL BLEEDING: 0

## 2024-08-22 NOTE — PROGRESS NOTES
Chief Complaint   Patient presents with    1 Month Follow-Up         \"Have you been to the ER, urgent care clinic since your last visit?  Hospitalized since your last visit?\"    NO    “Have you seen or consulted any other health care providers outside of [facility] since your last visit?”    NO        “Have you had a colorectal cancer screening such as a colonoscopy/FIT/Cologuard?    NO    Scheduled for September.      Click Here for Release of Records Request     Health Maintenance Due   Topic Date Due    Hepatitis B vaccine (1 of 3 - 19+ 3-dose series) Never done    Colorectal Cancer Screen  Never done    Shingles vaccine (1 of 2) Never done    COVID-19 Vaccine (3 - 2023-24 season) 09/01/2023    Annual Wellness Visit (Medicare Advantage)  Never done    Flu vaccine (1) Never done       
[facility] Attending Attestation: While the patient was in clinic or immediately following the patient leaving the clinic, I reviewed the patient's medical history, the resident's findings on physical examination, and the patient's diagnosis and treatment plan with the resident and agree with the documentation in the note.     Wilber Cristobal MD    
swelling       Social History     Tobacco Use    Smoking status: Never    Smokeless tobacco: Never   Substance Use Topics    Alcohol use: Yes     Comment: occassionally    Drug use: No       Family History   Problem Relation Age of Onset    Heart Disease Mother     Thyroid Disease Mother        Review of Systems   Constitutional:  Positive for appetite change. Negative for chills and fever.   Respiratory:  Negative for cough and shortness of breath.    Cardiovascular:  Negative for chest pain and palpitations.   Gastrointestinal:  Negative for abdominal distention, abdominal pain, anal bleeding, blood in stool, constipation, diarrhea, nausea, rectal pain and vomiting.        Vitals    /81 (Site: Right Upper Arm, Position: Sitting, Cuff Size: Medium Adult)   Pulse 91   Temp 98.4 °F (36.9 °C) (Oral)   Resp 17   Ht 1.778 m (5' 10\")   Wt 77.1 kg (170 lb)   SpO2 99%   BMI 24.39 kg/m²       Physical Exam  Constitutional:       General: He is not in acute distress.     Appearance: Normal appearance.   Cardiovascular:      Rate and Rhythm: Normal rate and regular rhythm.      Heart sounds: No murmur heard.     No friction rub. No gallop.   Pulmonary:      Effort: Pulmonary effort is normal.      Breath sounds: Normal breath sounds.   Abdominal:      General: Abdomen is flat. Bowel sounds are normal.      Palpations: Abdomen is soft.   Skin:     General: Skin is warm and dry.   Neurological:      Mental Status: He is alert and oriented to person, place, and time.   Psychiatric:         Behavior: Behavior normal.         Thought Content: Thought content normal.         Judgment: Judgment normal.        Assessment/Plan:    1. Unintentional weight loss  Improving. Today weight 179 lb, increased from 166 lb this last 06/2024. Reporting improved appetite and oral intake following medication adjustment - decreased risperidone from 6 mg twice daily to 4 mg twice daily. Still underlying concern for possible

## 2024-08-23 LAB
ALBUMIN SERPL-MCNC: 4 G/DL (ref 3.5–5)
ALBUMIN/GLOB SERPL: 1.4 (ref 1.1–2.2)
ALP SERPL-CCNC: 222 U/L (ref 45–117)
ALT SERPL-CCNC: 112 U/L (ref 12–78)
ANION GAP SERPL CALC-SCNC: 5 MMOL/L (ref 5–15)
AST SERPL-CCNC: 356 U/L (ref 15–37)
BILIRUB SERPL-MCNC: 0.7 MG/DL (ref 0.2–1)
BUN SERPL-MCNC: 10 MG/DL (ref 6–20)
BUN/CREAT SERPL: 9 (ref 12–20)
CALCIUM SERPL-MCNC: 9.7 MG/DL (ref 8.5–10.1)
CHLORIDE SERPL-SCNC: 105 MMOL/L (ref 97–108)
CO2 SERPL-SCNC: 29 MMOL/L (ref 21–32)
CREAT SERPL-MCNC: 1.14 MG/DL (ref 0.7–1.3)
GLOBULIN SER CALC-MCNC: 2.9 G/DL (ref 2–4)
GLUCOSE SERPL-MCNC: 93 MG/DL (ref 65–100)
POTASSIUM SERPL-SCNC: 4.2 MMOL/L (ref 3.5–5.1)
PROT SERPL-MCNC: 6.9 G/DL (ref 6.4–8.2)
SODIUM SERPL-SCNC: 139 MMOL/L (ref 136–145)

## 2024-08-24 DIAGNOSIS — R79.89 ELEVATED LFTS: ICD-10-CM

## 2024-08-26 RX ORDER — ATORVASTATIN CALCIUM 80 MG/1
80 TABLET, FILM COATED ORAL DAILY
Qty: 90 TABLET | Refills: 1 | Status: SHIPPED | OUTPATIENT
Start: 2024-08-26

## 2024-09-12 DIAGNOSIS — F31.70 BIPOLAR DISORDER, CURRENTLY IN REMISSION, MOST RECENT EPISODE UNSPECIFIED (HCC): ICD-10-CM

## 2024-09-12 DIAGNOSIS — F41.9 ANXIETY: ICD-10-CM

## 2024-09-12 DIAGNOSIS — F20.89 OTHER SCHIZOPHRENIA (HCC): ICD-10-CM

## 2024-09-12 RX ORDER — OLANZAPINE 10 MG/1
20 TABLET ORAL NIGHTLY
Qty: 30 TABLET | Refills: 3
Start: 2024-09-12

## 2024-09-12 RX ORDER — SERTRALINE HYDROCHLORIDE 100 MG/1
150 TABLET, FILM COATED ORAL DAILY
Qty: 90 TABLET | Refills: 1
Start: 2024-09-12 | End: 2025-03-11

## 2024-09-12 RX ORDER — RISPERIDONE 4 MG/1
6 TABLET ORAL 2 TIMES DAILY
Qty: 180 TABLET | Refills: 1
Start: 2024-09-12 | End: 2025-03-11

## 2024-12-12 ENCOUNTER — OFFICE VISIT (OUTPATIENT)
Facility: CLINIC | Age: 59
End: 2024-12-12

## 2024-12-12 VITALS
BODY MASS INDEX: 24.48 KG/M2 | TEMPERATURE: 98 F | RESPIRATION RATE: 18 BRPM | HEIGHT: 70 IN | DIASTOLIC BLOOD PRESSURE: 67 MMHG | WEIGHT: 171 LBS | HEART RATE: 76 BPM | SYSTOLIC BLOOD PRESSURE: 112 MMHG | OXYGEN SATURATION: 100 %

## 2024-12-12 DIAGNOSIS — R74.01 TRANSAMINITIS: ICD-10-CM

## 2024-12-12 DIAGNOSIS — R63.4 UNINTENTIONAL WEIGHT LOSS: ICD-10-CM

## 2024-12-12 DIAGNOSIS — I10 ESSENTIAL HYPERTENSION: ICD-10-CM

## 2024-12-12 DIAGNOSIS — R74.8 ELEVATED ALKALINE PHOSPHATASE LEVEL: ICD-10-CM

## 2024-12-12 DIAGNOSIS — E80.6 HYPERBILIRUBINEMIA: ICD-10-CM

## 2024-12-12 DIAGNOSIS — R17 PAINLESS JAUNDICE: Primary | ICD-10-CM

## 2024-12-12 RX ORDER — LUMATEPERONE 42 MG/1
42 CAPSULE ORAL DAILY
COMMUNITY
Start: 2024-12-03

## 2024-12-12 RX ORDER — AMLODIPINE BESYLATE 10 MG/1
10 TABLET ORAL DAILY
Qty: 90 TABLET | Refills: 1 | Status: SHIPPED | OUTPATIENT
Start: 2024-12-12

## 2024-12-12 NOTE — PATIENT INSTRUCTIONS
GI follow up appointment 12/18/24 at 3pm with Dr. Benny Cummings will call you and let you know when your MRCP is scheduled for at [facility] in [place].

## 2024-12-12 NOTE — PROGRESS NOTES
Chief Complaint   Patient presents with    ED Follow-up     Hepatitis. A,B,C negative.         \"Have you been to the ER, urgent care clinic since your last visit?  Hospitalized since your last visit?\"    YES- [facility] Hepatitis.    “Have you seen or consulted any other health care providers outside of [facility] since your last visit?”    NO        “Have you had a colorectal cancer screening such as a colonoscopy/FIT/Cologuard?    YES- [facility]         Click Here for Release of Records Request     Health Maintenance Due   Topic Date Due    Hepatitis B vaccine (1 of 3 - 19+ 3-dose series) Never done    Colorectal Cancer Screen  Never done    Shingles vaccine (1 of 2) Never done    Annual Wellness Visit (Medicare Advantage)  Never done    Flu vaccine (1) Never done    COVID-19 Vaccine (3 - 2023-24 season) 09/01/2024

## 2024-12-12 NOTE — PROGRESS NOTES
History of Present Illness:    Raul Gates is a 59 y.o. male who presents for ER follow up.    He had CMP drawn prior to colonoscopy on 12/10 that showed acute transaminitis, hyperbilirubinemia, and elevated alkaline phosphatase.   He was told to go to ED by his GI doctor  He had a CT scan of his abdomen that showed thickening of his descending colon and hepatic flexure, otherwise unremarkable.       No past medical history on file.    Current Outpatient Medications   Medication Sig Dispense Refill    CAPLYTA 42 MG capsule Take 1 capsule by mouth daily      amLODIPine (NORVASC) 10 MG tablet Take 1 tablet by mouth daily 90 tablet 1    OLANZapine (ZYPREXA) 10 MG tablet Take 2 tablets by mouth nightly (Patient not taking: Reported on 12/12/2024) 30 tablet 3    risperiDONE (RISPERDAL) 4 MG tablet Take 1.5 tablets by mouth 2 times daily (Patient not taking: Reported on 12/12/2024) 180 tablet 1    sertraline (ZOLOFT) 100 MG tablet Take 1.5 tablets by mouth daily (Patient not taking: Reported on 12/12/2024) 90 tablet 1    atorvastatin (LIPITOR) 80 MG tablet TAKE ONE TABLET BY MOUTH EVERY DAY (Patient not taking: Reported on 12/12/2024) 90 tablet 1    potassium chloride (KLOR-CON M) 10 MEQ extended release tablet Take 1 tablet by mouth daily (Patient not taking: Reported on 12/12/2024) 30 tablet 0    thiamine 100 MG tablet Take 1 tablet by mouth daily (Patient not taking: Reported on 12/12/2024) 30 tablet 3    folic acid (FOLVITE) 1 MG tablet Take 1 tablet by mouth daily (Patient not taking: Reported on 12/12/2024) 90 tablet 1     No current facility-administered medications for this visit.       Social History     Tobacco Use    Smoking status: Never    Smokeless tobacco: Never   Substance Use Topics    Alcohol use: Yes     Comment: occassionally    Drug use: No         Physical Examination:     /67 (Site: Right Upper Arm, Position: Sitting, Cuff Size: Medium Adult)   Pulse 76   Temp 98 °F (36.7 °C)

## 2024-12-13 ENCOUNTER — TELEPHONE (OUTPATIENT)
Facility: CLINIC | Age: 59
End: 2024-12-13

## 2024-12-13 LAB
ALBUMIN SERPL-MCNC: 2.2 G/DL (ref 3.5–5)
ALBUMIN/GLOB SERPL: 0.6 (ref 1.1–2.2)
ALP SERPL-CCNC: 1447 U/L (ref 45–117)
ALT SERPL-CCNC: 127 U/L (ref 12–78)
ANION GAP SERPL CALC-SCNC: 7 MMOL/L (ref 2–12)
AST SERPL-CCNC: 485 U/L (ref 15–37)
BILIRUB SERPL-MCNC: 12.5 MG/DL (ref 0.2–1)
BUN SERPL-MCNC: 7 MG/DL (ref 6–20)
BUN/CREAT SERPL: 5 (ref 12–20)
CALCIUM SERPL-MCNC: 8.9 MG/DL (ref 8.5–10.1)
CHLORIDE SERPL-SCNC: 98 MMOL/L (ref 97–108)
CO2 SERPL-SCNC: 28 MMOL/L (ref 21–32)
CREAT SERPL-MCNC: 1.42 MG/DL (ref 0.7–1.3)
ERYTHROCYTE [DISTWIDTH] IN BLOOD BY AUTOMATED COUNT: 20.6 % (ref 11.5–14.5)
GLOBULIN SER CALC-MCNC: 3.8 G/DL (ref 2–4)
GLUCOSE SERPL-MCNC: 97 MG/DL (ref 65–100)
HCT VFR BLD AUTO: 31.9 % (ref 36.6–50.3)
HGB BLD-MCNC: 10.5 G/DL (ref 12.1–17)
MCH RBC QN AUTO: 30.1 PG (ref 26–34)
MCHC RBC AUTO-ENTMCNC: 32.9 G/DL (ref 30–36.5)
MCV RBC AUTO: 91.4 FL (ref 80–99)
NRBC # BLD: 0 K/UL (ref 0–0.01)
NRBC BLD-RTO: 0 PER 100 WBC
PLATELET # BLD AUTO: 85 K/UL (ref 150–400)
POTASSIUM SERPL-SCNC: 3.2 MMOL/L (ref 3.5–5.1)
PROT SERPL-MCNC: 6 G/DL (ref 6.4–8.2)
RBC # BLD AUTO: 3.49 M/UL (ref 4.1–5.7)
SODIUM SERPL-SCNC: 133 MMOL/L (ref 136–145)
WBC # BLD AUTO: 5.4 K/UL (ref 4.1–11.1)

## 2024-12-13 NOTE — TELEPHONE ENCOUNTER
Returned call to patient re imaging order and future appts.    Verbalizes understanding.    Will call [facility] to verify appt date/time and return call to patient in future.

## 2024-12-15 ENCOUNTER — TELEPHONE (OUTPATIENT)
Age: 59
End: 2024-12-15

## 2024-12-15 DIAGNOSIS — E87.6 HYPOKALEMIA: ICD-10-CM

## 2024-12-15 DIAGNOSIS — B27.00 POSITIVE TEST FOR EPSTEIN-BARR VIRUS (EBV): Primary | ICD-10-CM

## 2024-12-15 LAB — EBV NA IGG SER-ACNC: 367 U/ML (ref 0–17.9)

## 2024-12-15 RX ORDER — POTASSIUM CHLORIDE 1500 MG/1
40 TABLET, EXTENDED RELEASE ORAL ONCE
Qty: 2 TABLET | Refills: 0 | Status: SHIPPED | OUTPATIENT
Start: 2024-12-15 | End: 2024-12-15

## 2024-12-18 LAB
CMV DNA SPEC QL NAA+PROBE: NEGATIVE
SPECIMEN SOURCE: NORMAL

## 2024-12-19 ENCOUNTER — OFFICE VISIT (OUTPATIENT)
Facility: CLINIC | Age: 59
End: 2024-12-19

## 2024-12-19 VITALS
HEART RATE: 89 BPM | WEIGHT: 173 LBS | DIASTOLIC BLOOD PRESSURE: 76 MMHG | TEMPERATURE: 98 F | OXYGEN SATURATION: 100 % | SYSTOLIC BLOOD PRESSURE: 146 MMHG | BODY MASS INDEX: 24.77 KG/M2 | HEIGHT: 70 IN | RESPIRATION RATE: 16 BRPM

## 2024-12-19 DIAGNOSIS — F10.20 ALCOHOL DEPENDENCE, UNCOMPLICATED (HCC): ICD-10-CM

## 2024-12-19 DIAGNOSIS — R74.8 ELEVATED ALKALINE PHOSPHATASE LEVEL: ICD-10-CM

## 2024-12-19 DIAGNOSIS — E80.6 HYPERBILIRUBINEMIA: ICD-10-CM

## 2024-12-19 DIAGNOSIS — R74.01 TRANSAMINITIS: Primary | ICD-10-CM

## 2024-12-19 NOTE — PROGRESS NOTES
Chief Complaint   Patient presents with    Follow-up     173, 89, 100, 98.0, 146/76    \"Have you been to the ER, urgent care clinic since your last visit?  Hospitalized since your last visit?\"    NO    “Have you seen or consulted any other health care providers outside of [facility] since your last visit?”    YES- GI.            Click Here for Release of Records Request     Health Maintenance Due   Topic Date Due    Hepatitis B vaccine (1 of 3 - 19+ 3-dose series) Never done    Shingles vaccine (1 of 2) Never done    Annual Wellness Visit (Medicare Advantage)  Never done    COVID-19 Vaccine (3 - 2023-24 season) 09/01/2024       
I discussed the patient's history and physical exam with the resident. I reviewed the assessment and plan and agree with the resident's documentation.     
 Resp 16   Ht 1.778 m (5' 10\")   Wt 78.5 kg (173 lb)   SpO2 100%   BMI 24.82 kg/m²     GEN: No apparent distress. Alert and oriented and responds to all questions appropriately.  EYES:  scleral icterus; extraocular movements are intact  EAR: External ears are normal.    OROPHYARYNX: sublingual jaundice     LUNGS: Respirations unlabored  NEUROLOGIC:  No focal neurologic deficits. Coordination and gait grossly intact.   EXT: Well perfused. No edema.  SKIN: No obvious rashes.      Assessment and Plan:    Raul was seen today for follow-up.    Diagnoses and all orders for this visit:    Transaminitis  Elevated alkaline phosphatase level  Hyperbilirubinemia  Labs are improving. CMP obtained at GI office with down-trending AST, ALT, ALK PHOS, and BILI levels. MRCP normal. EBV IgG positive, difficult to say whether acute or chronic. Patient remains asymptomatic. Remains jaundiced on exam. Most likely 2/2 alcohol use vs ?EBV.  - continue to monitor   - he has a follow up with GI next month   - continue to hold hepatotoxic drugs, can reintroduce as labs normalize     Alcohol dependence, uncomplicated (HCC)  Last drink 12/7. Will usually drink a 6-pack 3 times a week. Discussed implications. Patient would like to quit.         Raul Gates expressed understanding of this plan. An AVS was printed and given to the patient.     Discussed with attending.    Maxime Saunders MD  Family Medicine PGY-3

## 2025-01-02 ENCOUNTER — TELEPHONE (OUTPATIENT)
Facility: CLINIC | Age: 60
End: 2025-01-02

## 2025-01-02 NOTE — TELEPHONE ENCOUNTER
2019 DEXA shows osteoporosis  Check Vit D level  PT does take a calcium supp which is 3 small pills at once  Next DEXA due this Dec  Order given  Returned call to patient, patient notified that he had actually received reminder call. Thanked for return call.

## 2025-01-14 ENCOUNTER — OFFICE VISIT (OUTPATIENT)
Facility: CLINIC | Age: 60
End: 2025-01-14

## 2025-01-14 VITALS
OXYGEN SATURATION: 100 % | TEMPERATURE: 97.8 F | BODY MASS INDEX: 26.05 KG/M2 | WEIGHT: 182 LBS | SYSTOLIC BLOOD PRESSURE: 162 MMHG | DIASTOLIC BLOOD PRESSURE: 70 MMHG | RESPIRATION RATE: 18 BRPM | HEIGHT: 70 IN | HEART RATE: 91 BPM

## 2025-01-14 DIAGNOSIS — E78.2 MIXED HYPERLIPIDEMIA: ICD-10-CM

## 2025-01-14 DIAGNOSIS — I10 PRIMARY HYPERTENSION: Primary | ICD-10-CM

## 2025-01-14 RX ORDER — PRAZOSIN HYDROCHLORIDE 2 MG/1
CAPSULE ORAL
COMMUNITY
Start: 2025-01-10

## 2025-01-14 RX ORDER — LOSARTAN POTASSIUM 25 MG/1
25 TABLET ORAL DAILY
Qty: 90 TABLET | Refills: 1 | Status: SHIPPED | OUTPATIENT
Start: 2025-01-14

## 2025-01-14 RX ORDER — LANOLIN ALCOHOL/MO/W.PET/CERES
100 CREAM (GRAM) TOPICAL DAILY
Qty: 30 TABLET | Refills: 3 | Status: CANCELLED | OUTPATIENT
Start: 2025-01-14

## 2025-01-14 RX ORDER — HYDROCHLOROTHIAZIDE 12.5 MG/1
12.5 CAPSULE ORAL EVERY MORNING
Qty: 90 CAPSULE | Refills: 1 | Status: SHIPPED | OUTPATIENT
Start: 2025-01-14

## 2025-01-14 RX ORDER — ARIPIPRAZOLE 10 MG/1
TABLET ORAL
COMMUNITY
Start: 2025-01-10

## 2025-01-14 RX ORDER — FOLIC ACID 1 MG/1
1 TABLET ORAL DAILY
Qty: 90 TABLET | Refills: 1 | Status: CANCELLED | OUTPATIENT
Start: 2025-01-14

## 2025-01-14 RX ORDER — ATORVASTATIN CALCIUM 10 MG/1
10 TABLET, FILM COATED ORAL DAILY
Qty: 45 TABLET | Refills: 1 | Status: SHIPPED | OUTPATIENT
Start: 2025-01-14

## 2025-01-14 ASSESSMENT — PATIENT HEALTH QUESTIONNAIRE - PHQ9
8. MOVING OR SPEAKING SO SLOWLY THAT OTHER PEOPLE COULD HAVE NOTICED. OR THE OPPOSITE, BEING SO FIGETY OR RESTLESS THAT YOU HAVE BEEN MOVING AROUND A LOT MORE THAN USUAL: NOT AT ALL
SUM OF ALL RESPONSES TO PHQ9 QUESTIONS 1 & 2: 4
3. TROUBLE FALLING OR STAYING ASLEEP: NOT AT ALL
SUM OF ALL RESPONSES TO PHQ QUESTIONS 1-9: 5
5. POOR APPETITE OR OVEREATING: NOT AT ALL
7. TROUBLE CONCENTRATING ON THINGS, SUCH AS READING THE NEWSPAPER OR WATCHING TELEVISION: NOT AT ALL
4. FEELING TIRED OR HAVING LITTLE ENERGY: NOT AT ALL
SUM OF ALL RESPONSES TO PHQ QUESTIONS 1-9: 5
1. LITTLE INTEREST OR PLEASURE IN DOING THINGS: MORE THAN HALF THE DAYS
SUM OF ALL RESPONSES TO PHQ QUESTIONS 1-9: 5
2. FEELING DOWN, DEPRESSED OR HOPELESS: MORE THAN HALF THE DAYS
10. IF YOU CHECKED OFF ANY PROBLEMS, HOW DIFFICULT HAVE THESE PROBLEMS MADE IT FOR YOU TO DO YOUR WORK, TAKE CARE OF THINGS AT HOME, OR GET ALONG WITH OTHER PEOPLE: VERY DIFFICULT
9. THOUGHTS THAT YOU WOULD BE BETTER OFF DEAD, OR OF HURTING YOURSELF: NOT AT ALL
6. FEELING BAD ABOUT YOURSELF - OR THAT YOU ARE A FAILURE OR HAVE LET YOURSELF OR YOUR FAMILY DOWN: SEVERAL DAYS
SUM OF ALL RESPONSES TO PHQ QUESTIONS 1-9: 5

## 2025-01-14 NOTE — PROGRESS NOTES
History of Present Illness:    Raul Gates is a 59 y.o. male who presents for HTN follow up.    Complaining of LE swelling, requesting to be taken off Amlodipine   Home log with majority uncontrolled reads  Denies chest pain, dyspnea, dizziness, lightheadedness       Physical Examination:     BP (!) 162/70 (Site: Right Upper Arm, Position: Sitting, Cuff Size: Medium Adult)   Pulse 91   Temp 97.8 °F (36.6 °C) (Oral)   Resp 18   Ht 1.778 m (5' 10\")   Wt 82.6 kg (182 lb)   SpO2 100%   BMI 26.11 kg/m²     GEN: No apparent distress. Alert and oriented and responds to all questions appropriately.  EYES:  Conjunctiva clear; extraocular movements are intact  EAR: External ears are normal.  Tympanic membranes are clear and without effusion.        LUNGS: Respirations unlabored  NEUROLOGIC:  No focal neurologic deficits. Coordination and gait grossly intact.   EXT: Well perfused. No edema.  SKIN: No obvious rashes.        No past medical history on file.    Current Outpatient Medications   Medication Sig Dispense Refill    ARIPiprazole (ABILIFY) 10 MG tablet       prazosin (MINIPRESS) 2 MG capsule       losartan (COZAAR) 25 MG tablet Take 1 tablet by mouth daily 90 tablet 1    hydroCHLOROthiazide 12.5 MG capsule Take 1 capsule by mouth every morning 90 capsule 1    atorvastatin (LIPITOR) 10 MG tablet Take 1 tablet by mouth daily 45 tablet 1    CAPLYTA 42 MG capsule Take 1 capsule by mouth daily       No current facility-administered medications for this visit.       Social History     Tobacco Use    Smoking status: Never    Smokeless tobacco: Never   Substance Use Topics    Alcohol use: Yes     Comment: occassionally    Drug use: No         Assessment and Plan:    Raul was seen today for follow-up.    Diagnoses and all orders for this visit:    Primary hypertension  BP uncontrolled.   - discontinue Amlodipine due to side effect of swelling  - start Losartan 25mg and HCTZ 12.5mg   - RTC in 4 weeks with BP

## 2025-02-10 DIAGNOSIS — I10 ESSENTIAL HYPERTENSION: Primary | ICD-10-CM

## 2025-02-10 NOTE — PROGRESS NOTES
Remote Patient Monitoring Treatment Plan    Received request from ACM/Sirena Rosas RN   to order remote patient monitoring for in home monitoring of HTN; Condition managed by Maxime Saunders MD .  and order completed.     Patient will be monitoring blood pressure .      Patient will engage in Remote Patient Monitoring each day to develop the skills necessary for self management.       RPM Care Team Responsibilities:   Alerts will be reviewed daily and addressed within 2-4 hours during operational hours (Monday -Friday 9 am-4 pm)  Alert response and intervention documented in patient medical record  Alert response escalated to PCP per protocol and documented in patient medical record  Patient monitored over approximately  days  Discharge from program based on self-management readiness    See care coordination encounters for additional details.

## 2025-02-11 ENCOUNTER — CARE COORDINATION (OUTPATIENT)
Facility: CLINIC | Age: 60
End: 2025-02-11

## 2025-02-11 NOTE — CARE COORDINATION
Kit Order   Remote Patient Kit Ordering Note      Date/Time:  2/11/2025 10:21 AM      CCSS placed phone call to patient/family today to notify of RPM kit order; patient/family was available; discussed the following topics below and all questions answered.    [x[facility]  [x] Patient will receive package over the next 1-3 business days. Someone 21 years or older must be present to sign for UPS delivery.  [x] HRS will contact patient within 24 hours, an HRS  will call the patient directly: If the patient does not answer, HRS will follow up with the clinical team notifying them about the unsuccessful attempt to contact the patient. HRS will make three call attempts to the patient.Provide patient with [facility] Virtual install number is: [phone].  [x] The RPM Nurse will contact patient once equipment is active to welcome them to the program.                                                         [x] Hours of RPM monitoring - Monday-Friday 7180-3139; encourage patient to get vitals entered by Noon each day to have the alert addressed same day.  [x][facility] fly[facility] to patient.                      ACM made aware the RPM kit has been ordered.

## 2025-02-17 ENCOUNTER — CARE COORDINATION (OUTPATIENT)
Dept: CARE COORDINATION | Age: 60
End: 2025-02-17

## 2025-02-17 NOTE — CARE COORDINATION
2025 12:41 PM  RPM Verification RPM Verification and Welcome Call Successful? Yes,   Remote Patient Monitoring Welcome Note  Date/Time:  2025 12:41 PM  Patient Current Location: [place]  Verified patients name and  as identifiers.     Completed and confirmed the following:  [x] Patient received all RPM equipment (tablet, scale, blood pressure device and cuff, and pulse oximeter)  Cuff Size: large (13.8\"-19.68\")    Weight Scale:   Weight monitoring not ordered                     [x] Instructed patient keep box for use when returning equipment                                                          [x] Reviewed Patient Welcome Letter with patient    [x]  Reviewed Consent Form (Consent form signed.  Not scanned into EPIC at this time)  Copy of consent form in chart.                 [x] Reviewed expectations for patient and care team  Monitoring hours M-F 9-4pm  It is important to take your vitals every day, even on the weekends,to keep your care team aware of how you are doing every day of the week.  Completing monitoring by 12pm on  so that alerts can be responded to in the same day  Patient weighs self at same time every day (or after urinating and waking up)  Take blood pressure 1-2 hrs after medications   RPM team may have different phone area code (including [place], [place], [place] or [place])                              [x] Instructed patient to keep scale on flat surface (Weight monitoring not ordered)                                                        [x] Instructed patient to keep tablet plugged in at all times                         [x] Instructed how to contact IT support  ([phone])  [x] Provided Remote Patient Monitoring care  information   Emergency Contact Verified: None listed.           All questions answered at this time.    LPN reviewed patients reported daily Remote Patient Monitoring metrics. All reported metrics are within alert parameters.   Will route to case

## 2025-03-10 ENCOUNTER — OFFICE VISIT (OUTPATIENT)
Facility: CLINIC | Age: 60
End: 2025-03-10

## 2025-03-10 VITALS
HEART RATE: 77 BPM | RESPIRATION RATE: 16 BRPM | OXYGEN SATURATION: 98 % | DIASTOLIC BLOOD PRESSURE: 67 MMHG | WEIGHT: 182 LBS | TEMPERATURE: 98.5 F | BODY MASS INDEX: 26.05 KG/M2 | HEIGHT: 70 IN | SYSTOLIC BLOOD PRESSURE: 128 MMHG

## 2025-03-10 DIAGNOSIS — I10 PRIMARY HYPERTENSION: Primary | ICD-10-CM

## 2025-03-10 NOTE — PROGRESS NOTES
Chief Complaint   Patient presents with    Follow-up     BP, EDEMA.         \"Have you been to the ER, urgent care clinic since your last visit?  Hospitalized since your last visit?\"    NO    “Have you seen or consulted any other health care providers outside of [facility] since your last visit?”    YES- [facility].            Click Here for Release of Records Request     Health Maintenance Due   Topic Date Due    Hepatitis B vaccine (1 of 3 - 19+ 3-dose series) Never done    Shingles vaccine (1 of 2) Never done    Pneumococcal 50+ years Vaccine (1 of 1 - PCV) Never done    COVID-19 Vaccine (3 - 2024-25 season) 09/01/2024    Annual Wellness Visit (Medicare Advantage)  Never done

## 2025-03-10 NOTE — PROGRESS NOTES
History of Present Illness:    Raul Gates is a 59 y.o. male who presents for BP follow up    Doing well, no complaints  LE swelling has improved   Denies chest pain, dizziness    Has been sober from alcohol for 3 months now      Physical Examination:     /67 (BP Site: Left Upper Arm, Patient Position: Sitting)   Pulse 77   Temp 98.5 °F (36.9 °C)   Resp 16   Ht 1.778 m (5' 10\")   Wt 82.6 kg (182 lb)   SpO2 98%   BMI 26.11 kg/m²     GEN: No apparent distress. Alert and oriented and responds to all questions appropriately.  EYES:  Conjunctiva clear; extraocular movements are intact  EAR: External ears are normal.        LUNGS: Respirations unlabored   NEUROLOGIC:  No focal neurologic deficits. Coordination and gait grossly intact.   EXT: Well perfused. No edema.  SKIN: No obvious rashes.        No past medical history on file.    Current Outpatient Medications   Medication Sig Dispense Refill    ARIPiprazole (ABILIFY) 5 MG tablet Take 1 tablet by mouth daily      prazosin (MINIPRESS) 5 MG capsule Take 1 capsule by mouth nightly      losartan (COZAAR) 25 MG tablet Take 1 tablet by mouth daily 90 tablet 1    hydroCHLOROthiazide 12.5 MG capsule Take 1 capsule by mouth every morning 90 capsule 1    atorvastatin (LIPITOR) 10 MG tablet Take 1 tablet by mouth daily 45 tablet 1    CAPLYTA 42 MG capsule Take 1 capsule by mouth daily       No current facility-administered medications for this visit.         Assessment and Plan:    Raul was seen today for follow-up.    Diagnoses and all orders for this visit:    Primary hypertension  BP well controlled in office today. LE swelling greatly improved since switching amlodipine for losartan. His psychiatrist increased dose of prazosin, which will also help with BP.   - continue losartan 25mg and HCTZ 12.5mg      Raul Gates expressed understanding of this plan. An AVS was printed and given to the patient.     Discussed with attending.    Maxime Saunders

## 2025-03-28 ENCOUNTER — CARE COORDINATION (OUTPATIENT)
Dept: CARE COORDINATION | Age: 60
End: 2025-03-28

## 2025-03-28 NOTE — CARE COORDINATION
Remote Patient Monitoring Note  Date/Time:  3/28/2025 12:19 PM  Patient Current Location: [place]  LPN attempted to contact patient by telephone regarding yellow alert received for no RPM metrics entered x 2 days.   Background: Pt enrolled in RPM r/t HTN.  Clinical Interventions: Reviewed and followed up on alerts and treatments-No answer at this time and voice mailbox full.  Unable to leave message requesting return call.    Plan/Follow Up: Will continue to review, monitor and address alerts with follow up based on severity of symptoms and risk factors.

## 2025-04-04 ENCOUNTER — CARE COORDINATION (OUTPATIENT)
Dept: CARE COORDINATION | Age: 60
End: 2025-04-04

## 2025-04-08 ENCOUNTER — CARE COORDINATION (OUTPATIENT)
Dept: CASE MANAGEMENT | Age: 60
End: 2025-04-08

## 2025-04-08 NOTE — CARE COORDINATION
Date/Time:  4/8/2025 2:30 PM  LPN attempted to reach patient by telephone regarding  no metrics x 2 days  in remote patient monitoring program.unable to leave message . Will attempt to reach patient again.    Message sent to tablet   Message sent to patient via Patient Connect Portal for Remote Patient Monitoring     RPM Nurse message No readings in two days Hello, Please see an important message from your RPM Team:  This is a reminder that we have not received your readings for two days please enter them as soon as possible.     Please do not respond to this message; If you have a question or concern please call your Ambulatory Care Manager or your Primary Care Physician.

## 2025-04-09 ENCOUNTER — CARE COORDINATION (OUTPATIENT)
Dept: CARE COORDINATION | Age: 60
End: 2025-04-09

## 2025-04-09 NOTE — CARE COORDINATION
Remote Patient Monitoring Note      Date/Time:  4/9/2025 8:30 AM    LPN attempted to contact patient by telephone regarding yellow alert received for no metrics for 3 days.     Background:  High Blood-Pressure     Clinical Interventions: Unable to LM.    Plan/Follow Up: Will continue to review, monitor and address alerts with follow up based on severity of symptoms and risk factors.       CHEO Shelley [facility]/ CTN/ Remote Patient Monitoring  [phone]

## 2025-04-11 ENCOUNTER — CARE COORDINATION (OUTPATIENT)
Dept: OTHER | Facility: CLINIC | Age: 60
End: 2025-04-11

## 2025-04-11 NOTE — CARE COORDINATION
4/11/2025 1:22 PM  *  Unable to Reach Date/Time:  4/11/2025 1:22 PM  ACM attempted to reach patient by telephone regarding yellow alert r/t no BP x 2 days in remote patient monitoring program. Unable to leave a VM, the call fails. Will attempt to reach patient again.         KRISHNA Edwards, RN  Associate Care Manager   Cell: [phone]  [email or web address]

## 2025-04-14 ENCOUNTER — CARE COORDINATION (OUTPATIENT)
Dept: CASE MANAGEMENT | Age: 60
End: 2025-04-14

## 2025-04-14 NOTE — CARE COORDINATION
Date/Time:  4/14/2025 8:42 AM  LPN attempted to reach patient by telephone regarding no metrics x 5 days in remote patient monitoring program. Unable to leave message. Will attempt to reach patient again.    Unable to leave message . Recording states pt has calling restrictions  No Emergency contact  No MYCHART  MESSAGE sent to tablet  Message sent to patient via Patient Connect Portal for Remote Patient Monitoring     RPM Nurse message Reminder to keep tablet charged and connected Hello, Please see an important message from your RPM Team:  Please remember to keep the tablet charged and connected    Please do not respond to this message; If you have a question or concern please call your Ambulatory Care Manager or your Primary Care Physician.     Message sent to patient via Patient Connect Portal for Remote Patient Monitoring     RPM Nurse message Tablet Readings Hello, Please see an important message from your RPM Team:    Please remember to take your readings daily before noon.   Please do not respond to this message; If you have a question or concern please call your Ambulatory Care Manager or your Primary Care Physician.     Message to [facility]  Raul Gates  is currently enrolled in Remote Patient Monitoring (RPM) and has not entered vitals in 4 days. The RPM team has  Been Unable to Reach your patient to discuss adherence in RPM.    Please attempt to outreach your patient and discuss adherence with RPM. If patient is no longer interested in participating please send a dis-enrollment request to the RPM pool for processing.     Thank You,

## 2025-04-22 ENCOUNTER — CARE COORDINATION (OUTPATIENT)
Facility: CLINIC | Age: 60
End: 2025-04-22

## 2025-04-22 DIAGNOSIS — I10 ESSENTIAL HYPERTENSION: Primary | ICD-10-CM

## 2025-04-22 NOTE — PROGRESS NOTES
Remote Patient Order Discontinued    Received request from Sirena Zhao, NALINI to discontinue order for remote patient monitoring of HTN and order completed.

## 2025-04-22 NOTE — CARE COORDINATION
Kit Return No Answer Patient Raul Gates  04/22/25     Care Coordination  placed call to patient to arrange RPM kit  through [employer]. Number is blocked.      provided return and how to pack equipment in original packing via the patients voicemail if available and provided call back number should patient have questions.    Patient made aware [employer] will  equipment in 2-4 days.

## 2025-05-02 DIAGNOSIS — E78.2 MIXED HYPERLIPIDEMIA: ICD-10-CM

## 2025-05-02 RX ORDER — ATORVASTATIN CALCIUM 10 MG/1
10 TABLET, FILM COATED ORAL DAILY
Qty: 45 TABLET | Refills: 1 | Status: SHIPPED | OUTPATIENT
Start: 2025-05-02

## 2025-05-09 ENCOUNTER — OFFICE VISIT (OUTPATIENT)
Facility: CLINIC | Age: 60
End: 2025-05-09

## 2025-05-09 VITALS
HEART RATE: 69 BPM | WEIGHT: 191 LBS | HEIGHT: 70 IN | DIASTOLIC BLOOD PRESSURE: 83 MMHG | BODY MASS INDEX: 27.35 KG/M2 | TEMPERATURE: 97.6 F | SYSTOLIC BLOOD PRESSURE: 139 MMHG | RESPIRATION RATE: 16 BRPM | OXYGEN SATURATION: 99 %

## 2025-05-09 DIAGNOSIS — F20.89 OTHER SCHIZOPHRENIA (HCC): ICD-10-CM

## 2025-05-09 DIAGNOSIS — F10.20 ALCOHOL DEPENDENCE, UNCOMPLICATED (HCC): ICD-10-CM

## 2025-05-09 DIAGNOSIS — G89.29 CHRONIC RIGHT SHOULDER PAIN: Primary | ICD-10-CM

## 2025-05-09 DIAGNOSIS — I10 PRIMARY HYPERTENSION: ICD-10-CM

## 2025-05-09 DIAGNOSIS — M25.511 CHRONIC RIGHT SHOULDER PAIN: Primary | ICD-10-CM

## 2025-05-09 DIAGNOSIS — E78.2 MIXED HYPERLIPIDEMIA: ICD-10-CM

## 2025-05-09 RX ORDER — DIVALPROEX SODIUM 500 MG/1
500 TABLET, DELAYED RELEASE ORAL NIGHTLY
COMMUNITY
Start: 2025-04-30

## 2025-05-09 RX ORDER — KETOROLAC TROMETHAMINE 30 MG/ML
60 INJECTION, SOLUTION INTRAMUSCULAR; INTRAVENOUS ONCE
Status: COMPLETED | OUTPATIENT
Start: 2025-05-09 | End: 2025-05-09

## 2025-05-09 RX ORDER — NABUMETONE 500 MG/1
500 TABLET, FILM COATED ORAL 2 TIMES DAILY
Qty: 60 TABLET | Refills: 1 | Status: SHIPPED | OUTPATIENT
Start: 2025-05-09

## 2025-05-09 RX ADMIN — KETOROLAC TROMETHAMINE 60 MG: 30 INJECTION, SOLUTION INTRAMUSCULAR; INTRAVENOUS at 14:55

## 2025-05-09 NOTE — PROGRESS NOTES
Chief Complaint   Patient presents with    Shoulder Pain     Right shoulder pain, denies injury - has had pain off and on for years but constant recently         \"Have you been to the ER, urgent care clinic since your last visit?  Hospitalized since your last visit?\"    NO    “Have you seen or consulted any other health care providers outside of [facility] since your last visit?”    Evonne jackson             Click Here for Release of Records Request     Health Maintenance Due   Topic Date Due    Hepatitis B vaccine (1 of 3 - 19+ 3-dose series) Never done    Shingles vaccine (1 of 2) Never done    Pneumococcal 50+ years Vaccine (1 of 1 - PCV) Never done    COVID-19 Vaccine (3 - 2024-25 season) 09/01/2024    Annual Wellness Visit (Medicare Advantage)  Never done

## 2025-05-13 ENCOUNTER — RESULTS FOLLOW-UP (OUTPATIENT)
Facility: CLINIC | Age: 60
End: 2025-05-13

## 2025-06-30 DIAGNOSIS — I10 PRIMARY HYPERTENSION: ICD-10-CM

## 2025-06-30 RX ORDER — LOSARTAN POTASSIUM 25 MG/1
25 TABLET ORAL DAILY
Qty: 90 TABLET | Refills: 1 | Status: SHIPPED | OUTPATIENT
Start: 2025-06-30

## 2025-07-24 DIAGNOSIS — E78.2 MIXED HYPERLIPIDEMIA: ICD-10-CM

## 2025-07-25 RX ORDER — ATORVASTATIN CALCIUM 10 MG/1
10 TABLET, FILM COATED ORAL DAILY
Qty: 45 TABLET | Refills: 1 | Status: SHIPPED | OUTPATIENT
Start: 2025-07-25

## 2025-07-30 DIAGNOSIS — I10 PRIMARY HYPERTENSION: ICD-10-CM

## 2025-07-31 RX ORDER — HYDROCHLOROTHIAZIDE 12.5 MG/1
12.5 CAPSULE ORAL EVERY MORNING
Qty: 90 CAPSULE | Refills: 1 | Status: SHIPPED | OUTPATIENT
Start: 2025-07-31

## 2025-08-07 ENCOUNTER — OFFICE VISIT (OUTPATIENT)
Facility: CLINIC | Age: 60
End: 2025-08-07

## 2025-08-07 VITALS
SYSTOLIC BLOOD PRESSURE: 106 MMHG | BODY MASS INDEX: 27.49 KG/M2 | HEART RATE: 70 BPM | RESPIRATION RATE: 16 BRPM | TEMPERATURE: 99 F | DIASTOLIC BLOOD PRESSURE: 70 MMHG | HEIGHT: 70 IN | OXYGEN SATURATION: 100 % | WEIGHT: 192 LBS

## 2025-08-07 DIAGNOSIS — I10 ESSENTIAL HYPERTENSION: Primary | ICD-10-CM

## 2025-08-07 DIAGNOSIS — R74.8 ELEVATED ALKALINE PHOSPHATASE LEVEL: ICD-10-CM

## 2025-08-07 DIAGNOSIS — R79.89 ELEVATED LFTS: ICD-10-CM

## 2025-08-07 DIAGNOSIS — F20.89 OTHER SCHIZOPHRENIA (HCC): ICD-10-CM

## 2025-08-07 DIAGNOSIS — R53.83 OTHER FATIGUE: ICD-10-CM

## 2025-08-07 DIAGNOSIS — E78.00 PURE HYPERCHOLESTEROLEMIA: ICD-10-CM

## 2025-08-07 DIAGNOSIS — N52.8 OTHER MALE ERECTILE DYSFUNCTION: ICD-10-CM

## 2025-08-07 DIAGNOSIS — Z13.1 DIABETES MELLITUS SCREENING: ICD-10-CM

## 2025-08-07 DIAGNOSIS — Z12.5 SCREENING PSA (PROSTATE SPECIFIC ANTIGEN): ICD-10-CM

## 2025-08-07 RX ORDER — CLOZAPINE 50 MG/1
50 TABLET ORAL NIGHTLY
COMMUNITY
Start: 2025-06-11

## 2025-08-07 RX ORDER — HYDROCHLOROTHIAZIDE 25 MG/1
25 TABLET ORAL EVERY MORNING
Qty: 90 TABLET | Refills: 1 | Status: SHIPPED | OUTPATIENT
Start: 2025-08-07

## 2025-08-07 RX ORDER — HALOPERIDOL 10 MG/1
10 TABLET ORAL 2 TIMES DAILY
COMMUNITY
Start: 2025-08-05

## 2025-08-09 LAB
ALBUMIN SERPL-MCNC: 4.3 G/DL (ref 3.5–5.2)
ALBUMIN/GLOB SERPL: 1.7 (ref 1.1–2.2)
ALP SERPL-CCNC: 134 U/L (ref 40–129)
ALT SERPL-CCNC: 76 U/L (ref 10–50)
ANION GAP SERPL CALC-SCNC: 12 MMOL/L (ref 2–14)
AST SERPL-CCNC: 59 U/L (ref 10–50)
BASOPHILS # BLD: 0.04 K/UL (ref 0–0.1)
BASOPHILS NFR BLD: 0.8 % (ref 0–1)
BILIRUB SERPL-MCNC: 0.5 MG/DL (ref 0–1.2)
BUN SERPL-MCNC: 20 MG/DL (ref 6–20)
BUN/CREAT SERPL: 14 (ref 12–20)
CALCIUM SERPL-MCNC: 10.4 MG/DL (ref 8.6–10)
CHLORIDE SERPL-SCNC: 100 MMOL/L (ref 98–107)
CHOLEST SERPL-MCNC: 140 MG/DL (ref 0–200)
CO2 SERPL-SCNC: 28 MMOL/L (ref 20–29)
CREAT SERPL-MCNC: 1.45 MG/DL (ref 0.7–1.2)
DIFFERENTIAL METHOD BLD: ABNORMAL
EOSINOPHIL # BLD: 0.8 K/UL (ref 0–0.4)
EOSINOPHIL NFR BLD: 15.3 % (ref 0–7)
ERYTHROCYTE [DISTWIDTH] IN BLOOD BY AUTOMATED COUNT: 13.9 % (ref 11.5–14.5)
EST. AVERAGE GLUCOSE BLD GHB EST-MCNC: 98 MG/DL
GLOBULIN SER CALC-MCNC: 2.5 G/DL (ref 2–4)
GLUCOSE SERPL-MCNC: 93 MG/DL (ref 65–100)
HBA1C MFR BLD: 5.1 % (ref 4–5.6)
HCT VFR BLD AUTO: 39.8 % (ref 36.6–50.3)
HDLC SERPL-MCNC: 51 MG/DL (ref 40–60)
HDLC SERPL: 2.8 (ref 0–5)
HGB BLD-MCNC: 13.1 G/DL (ref 12.1–17)
IMM GRANULOCYTES # BLD AUTO: 0.01 K/UL (ref 0–0.04)
IMM GRANULOCYTES NFR BLD AUTO: 0.2 % (ref 0–0.5)
LDLC SERPL CALC-MCNC: 56 MG/DL
LYMPHOCYTES # BLD: 1.59 K/UL (ref 0.8–3.5)
LYMPHOCYTES NFR BLD: 30.5 % (ref 12–49)
MCH RBC QN AUTO: 28.8 PG (ref 26–34)
MCHC RBC AUTO-ENTMCNC: 32.9 G/DL (ref 30–36.5)
MCV RBC AUTO: 87.5 FL (ref 80–99)
MONOCYTES # BLD: 0.44 K/UL (ref 0–1)
MONOCYTES NFR BLD: 8.4 % (ref 5–13)
NEUTS SEG # BLD: 2.33 K/UL (ref 1.8–8)
NEUTS SEG NFR BLD: 44.8 % (ref 32–75)
NRBC # BLD: 0 K/UL (ref 0–0.01)
NRBC BLD-RTO: 0 PER 100 WBC
PLATELET # BLD AUTO: 260 K/UL (ref 150–400)
PMV BLD AUTO: 10.7 FL (ref 8.9–12.9)
POTASSIUM SERPL-SCNC: 4.2 MMOL/L (ref 3.5–5.1)
PROT SERPL-MCNC: 6.8 G/DL (ref 6.4–8.3)
PSA SERPL-MCNC: 1.2 NG/ML (ref 0–3.1)
RBC # BLD AUTO: 4.55 M/UL (ref 4.1–5.7)
RBC MORPH BLD: ABNORMAL
SODIUM SERPL-SCNC: 139 MMOL/L (ref 136–145)
TRIGL SERPL-MCNC: 164 MG/DL (ref 0–150)
VLDLC SERPL CALC-MCNC: 33 MG/DL
WBC # BLD AUTO: 5.2 K/UL (ref 4.1–11.1)

## 2025-08-11 ENCOUNTER — TELEPHONE (OUTPATIENT)
Facility: CLINIC | Age: 60
End: 2025-08-11

## 2025-08-11 LAB — TSH SERPL DL<=0.05 MIU/L-ACNC: 1.56 UIU/ML (ref 0.45–4.5)

## 2025-08-14 LAB
TESTOST FREE SERPL-MCNC: 3.4 PG/ML (ref 7.2–24)
TESTOST SERPL-MCNC: 480 NG/DL (ref 264–916)